# Patient Record
Sex: MALE | Race: WHITE | NOT HISPANIC OR LATINO | Employment: OTHER | URBAN - NONMETROPOLITAN AREA
[De-identification: names, ages, dates, MRNs, and addresses within clinical notes are randomized per-mention and may not be internally consistent; named-entity substitution may affect disease eponyms.]

---

## 2021-04-01 ENCOUNTER — HOSPITAL ENCOUNTER (OUTPATIENT)
Dept: GENERAL RADIOLOGY | Facility: HOSPITAL | Age: 56
Discharge: HOME | End: 2021-04-01
Admitting: NURSE PRACTITIONER

## 2021-04-01 ENCOUNTER — OFFICE VISIT (OUTPATIENT)
Dept: FAMILY MEDICINE CLINIC | Facility: CLINIC | Age: 56
End: 2021-04-01

## 2021-04-01 VITALS
TEMPERATURE: 97.9 F | HEIGHT: 74 IN | SYSTOLIC BLOOD PRESSURE: 118 MMHG | WEIGHT: 255 LBS | BODY MASS INDEX: 32.73 KG/M2 | HEART RATE: 64 BPM | OXYGEN SATURATION: 99 % | DIASTOLIC BLOOD PRESSURE: 78 MMHG | RESPIRATION RATE: 16 BRPM

## 2021-04-01 DIAGNOSIS — I10 ESSENTIAL HYPERTENSION: ICD-10-CM

## 2021-04-01 DIAGNOSIS — M79.602 PAIN IN BOTH UPPER EXTREMITIES: Primary | ICD-10-CM

## 2021-04-01 DIAGNOSIS — M79.602 PAIN IN BOTH UPPER EXTREMITIES: ICD-10-CM

## 2021-04-01 DIAGNOSIS — Z72.0 TOBACCO ABUSE: ICD-10-CM

## 2021-04-01 DIAGNOSIS — M54.2 CHRONIC NECK PAIN: ICD-10-CM

## 2021-04-01 DIAGNOSIS — D17.0 LIPOMA OF NECK: ICD-10-CM

## 2021-04-01 DIAGNOSIS — G89.29 CHRONIC NECK PAIN: ICD-10-CM

## 2021-04-01 DIAGNOSIS — E11.9 TYPE 2 DIABETES MELLITUS WITHOUT COMPLICATION, WITHOUT LONG-TERM CURRENT USE OF INSULIN (HCC): ICD-10-CM

## 2021-04-01 DIAGNOSIS — M79.601 PAIN IN BOTH UPPER EXTREMITIES: Primary | ICD-10-CM

## 2021-04-01 DIAGNOSIS — M79.601 PAIN IN BOTH UPPER EXTREMITIES: ICD-10-CM

## 2021-04-01 DIAGNOSIS — E78.5 HYPERLIPIDEMIA, UNSPECIFIED HYPERLIPIDEMIA TYPE: ICD-10-CM

## 2021-04-01 DIAGNOSIS — F43.10 PTSD (POST-TRAUMATIC STRESS DISORDER): ICD-10-CM

## 2021-04-01 PROCEDURE — 72050 X-RAY EXAM NECK SPINE 4/5VWS: CPT

## 2021-04-01 PROCEDURE — 99204 OFFICE O/P NEW MOD 45 MIN: CPT | Performed by: NURSE PRACTITIONER

## 2021-04-01 RX ORDER — GLIPIZIDE 5 MG/1
5 TABLET ORAL
COMMUNITY

## 2021-04-01 RX ORDER — LISINOPRIL 20 MG/1
20 TABLET ORAL DAILY
COMMUNITY

## 2021-04-01 RX ORDER — ASPIRIN 81 MG/1
81 TABLET ORAL DAILY
COMMUNITY

## 2021-04-01 RX ORDER — HYDROCODONE BITARTRATE AND ACETAMINOPHEN 5; 325 MG/1; MG/1
1 TABLET ORAL EVERY 6 HOURS PRN
Qty: 20 TABLET | Refills: 0 | Status: SHIPPED | OUTPATIENT
Start: 2021-04-01

## 2021-04-01 RX ORDER — BUSPIRONE HYDROCHLORIDE 15 MG/1
15 TABLET ORAL 2 TIMES DAILY
COMMUNITY

## 2021-04-01 RX ORDER — ATORVASTATIN CALCIUM 20 MG/1
20 TABLET, FILM COATED ORAL DAILY
COMMUNITY

## 2021-04-01 RX ORDER — FLUOXETINE HYDROCHLORIDE 20 MG/1
60 CAPSULE ORAL DAILY
COMMUNITY

## 2021-04-01 RX ORDER — AMLODIPINE BESYLATE 5 MG/1
5 TABLET ORAL DAILY
COMMUNITY

## 2021-04-01 RX ORDER — PRAZOSIN HYDROCHLORIDE 2 MG/1
4 CAPSULE ORAL NIGHTLY
COMMUNITY

## 2021-04-01 NOTE — PROGRESS NOTES
Please call the patient - Has deg disc disease.  [facility], he was interested in pain management.  If I could get a copy of his last [facility] labs, would like to see if maybe an anti-inflammatory would be appropriate.  Would like to do a trial of this before referral.  See if he can bring his [facility] labs in.  I gave him a short rx of norco till we figure things out.
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
## 2021-04-02 DIAGNOSIS — M54.2 CHRONIC NECK PAIN: Primary | ICD-10-CM

## 2021-04-02 DIAGNOSIS — G89.29 CHRONIC NECK PAIN: Primary | ICD-10-CM

## 2021-04-02 PROBLEM — Z72.0 TOBACCO ABUSE: Status: ACTIVE | Noted: 2021-04-02

## 2021-04-02 PROBLEM — D17.0 LIPOMA OF NECK: Status: ACTIVE | Noted: 2021-04-02

## 2021-04-02 PROBLEM — F43.10 PTSD (POST-TRAUMATIC STRESS DISORDER): Status: ACTIVE | Noted: 2021-04-02

## 2021-04-02 PROBLEM — E11.9 TYPE 2 DIABETES MELLITUS WITHOUT COMPLICATION, WITHOUT LONG-TERM CURRENT USE OF INSULIN (HCC): Status: ACTIVE | Noted: 2021-04-02

## 2021-04-02 PROBLEM — E78.5 HYPERLIPIDEMIA: Status: ACTIVE | Noted: 2021-04-02

## 2021-04-02 PROBLEM — I10 ESSENTIAL HYPERTENSION: Status: ACTIVE | Noted: 2021-04-02

## 2021-04-02 RX ORDER — MELOXICAM 15 MG/1
15 TABLET ORAL DAILY
Qty: 30 TABLET | Refills: 5 | Status: SHIPPED | OUTPATIENT
Start: 2021-04-02

## 2021-04-02 RX ORDER — MELOXICAM 15 MG/1
15 TABLET ORAL DAILY
Qty: 30 TABLET | Refills: 5 | Status: SHIPPED | OUTPATIENT
Start: 2021-04-02 | End: 2021-04-02 | Stop reason: SDUPTHER

## 2021-09-01 ENCOUNTER — OFFICE VISIT (OUTPATIENT)
Dept: FAMILY MEDICINE CLINIC | Facility: CLINIC | Age: 56
End: 2021-09-01

## 2021-09-01 VITALS
OXYGEN SATURATION: 98 % | RESPIRATION RATE: 16 BRPM | BODY MASS INDEX: 32.73 KG/M2 | HEART RATE: 64 BPM | HEIGHT: 74 IN | DIASTOLIC BLOOD PRESSURE: 82 MMHG | WEIGHT: 255 LBS | SYSTOLIC BLOOD PRESSURE: 140 MMHG

## 2021-09-01 DIAGNOSIS — Z20.822 SUSPECTED COVID-19 VIRUS INFECTION: ICD-10-CM

## 2021-09-01 DIAGNOSIS — J01.90 ACUTE NON-RECURRENT SINUSITIS, UNSPECIFIED LOCATION: Primary | ICD-10-CM

## 2021-09-01 PROCEDURE — 99213 OFFICE O/P EST LOW 20 MIN: CPT | Performed by: NURSE PRACTITIONER

## 2021-09-01 RX ORDER — AMOXICILLIN AND CLAVULANATE POTASSIUM 875; 125 MG/1; MG/1
1 TABLET, FILM COATED ORAL 2 TIMES DAILY
Qty: 20 TABLET | Refills: 0 | Status: SHIPPED | OUTPATIENT
Start: 2021-09-01 | End: 2021-09-11
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
## 2021-09-01 NOTE — PROGRESS NOTES
Covid 19 swab, done per oropharynx per MEET GERBER patient tolerated well, specimen secured and placed in freezer with order as directed.

## 2021-09-03 ENCOUNTER — TELEPHONE (OUTPATIENT)
Dept: INTERNAL MEDICINE | Facility: CLINIC | Age: 56
End: 2021-09-03

## 2021-09-03 DIAGNOSIS — U07.1 COVID-19: Primary | ICD-10-CM

## 2021-09-03 LAB — SARS-COV-2 RNA RESP QL NAA+PROBE: DETECTED

## 2021-09-03 RX ORDER — ATORVASTATIN CALCIUM 40 MG/1
40 TABLET, FILM COATED ORAL NIGHTLY
Qty: 14 TABLET | Refills: 0 | Status: SHIPPED | OUTPATIENT
Start: 2021-09-03

## 2021-09-03 RX ORDER — MELATONIN
1000 DAILY
Qty: 14 TABLET | Refills: 0 | Status: SHIPPED | OUTPATIENT
Start: 2021-09-03

## 2021-09-03 RX ORDER — FAMOTIDINE 20 MG/1
20 TABLET, FILM COATED ORAL 2 TIMES DAILY
Qty: 28 TABLET | Refills: 0 | Status: SHIPPED | OUTPATIENT
Start: 2021-09-03

## 2021-09-03 RX ORDER — ZINC SULFATE 50(220)MG
220 CAPSULE ORAL
Qty: 14 CAPSULE | Refills: 0 | Status: SHIPPED | OUTPATIENT
Start: 2021-09-03

## 2021-09-03 NOTE — TELEPHONE ENCOUNTER
Called patient with results and pt voiced understanding. Patient's wife was on the phone, too and states she has covid symptoms now with chills and body aches.

## 2021-09-03 NOTE — TELEPHONE ENCOUNTER
----- Message from ZABRINA Ley sent at 9/3/2021  7:45 AM CDT -----  Please call the patient - positive for covid.  Finish ABT for sinus issue as needed.  Advise ED if SOA, 10 day quarantine, covid cocktail sent.    Electronically signed by ZABRINA Ley, 09/03/21, 7:44 AM CDT.

## 2021-09-03 NOTE — PROGRESS NOTES
Please call the patient - positive for covid.  Finish ABT for sinus issue as needed.  Advise ED if SOA, 10 day quarantine, covid cocktail sent.    Electronically signed by ZABRINA Ley, 09/03/21, 7:44 AM CDT.

## 2024-10-14 ENCOUNTER — TELEPHONE (OUTPATIENT)
Dept: VASCULAR SURGERY | Facility: CLINIC | Age: 59
End: 2024-10-14
Payer: OTHER GOVERNMENT

## 2024-10-15 ENCOUNTER — PRE-ADMISSION TESTING (OUTPATIENT)
Dept: PREADMISSION TESTING | Facility: HOSPITAL | Age: 59
End: 2024-10-15
Payer: OTHER GOVERNMENT

## 2024-10-15 ENCOUNTER — HOSPITAL ENCOUNTER (OUTPATIENT)
Dept: GENERAL RADIOLOGY | Facility: HOSPITAL | Age: 59
Discharge: HOME | End: 2024-10-15
Payer: OTHER GOVERNMENT

## 2024-10-15 ENCOUNTER — OFFICE VISIT (OUTPATIENT)
Dept: VASCULAR SURGERY | Facility: CLINIC | Age: 59
End: 2024-10-15
Payer: OTHER GOVERNMENT

## 2024-10-15 VITALS
BODY MASS INDEX: 30.54 KG/M2 | HEART RATE: 64 BPM | HEIGHT: 74 IN | RESPIRATION RATE: 18 BRPM | DIASTOLIC BLOOD PRESSURE: 80 MMHG | WEIGHT: 238 LBS | SYSTOLIC BLOOD PRESSURE: 130 MMHG | OXYGEN SATURATION: 98 %

## 2024-10-15 VITALS
WEIGHT: 238.54 LBS | HEIGHT: 73 IN | DIASTOLIC BLOOD PRESSURE: 78 MMHG | BODY MASS INDEX: 31.61 KG/M2 | RESPIRATION RATE: 16 BRPM | OXYGEN SATURATION: 98 % | HEART RATE: 66 BPM | SYSTOLIC BLOOD PRESSURE: 126 MMHG

## 2024-10-15 DIAGNOSIS — I65.23 BILATERAL CAROTID ARTERY STENOSIS: ICD-10-CM

## 2024-10-15 DIAGNOSIS — I73.9 PAD (PERIPHERAL ARTERY DISEASE): ICD-10-CM

## 2024-10-15 DIAGNOSIS — E78.5 HYPERLIPIDEMIA, UNSPECIFIED HYPERLIPIDEMIA TYPE: ICD-10-CM

## 2024-10-15 DIAGNOSIS — M54.50 LUMBAR PAIN: ICD-10-CM

## 2024-10-15 DIAGNOSIS — I10 ESSENTIAL HYPERTENSION: ICD-10-CM

## 2024-10-15 DIAGNOSIS — M51.379 DEGENERATIVE DISC DISEASE AT L5-S1 LEVEL: Primary | ICD-10-CM

## 2024-10-15 LAB
ALBUMIN SERPL-MCNC: 4.7 G/DL (ref 3.5–5.2)
ALBUMIN/GLOB SERPL: 1.6 G/DL
ALP SERPL-CCNC: 65 U/L (ref 39–117)
ALT SERPL W P-5'-P-CCNC: 27 U/L (ref 1–41)
ANION GAP SERPL CALCULATED.3IONS-SCNC: 11 MMOL/L (ref 5–15)
APTT PPP: 28.8 SECONDS (ref 24.5–36)
AST SERPL-CCNC: 17 U/L (ref 1–40)
BILIRUB SERPL-MCNC: 0.9 MG/DL (ref 0–1.2)
BILIRUB UR QL STRIP: NEGATIVE
BUN SERPL-MCNC: 19 MG/DL (ref 8–23)
BUN/CREAT SERPL: 26 (ref 7–25)
CALCIUM SPEC-SCNC: 9.5 MG/DL (ref 8.6–10.5)
CHLORIDE SERPL-SCNC: 102 MMOL/L (ref 98–107)
CLARITY UR: CLEAR
CO2 SERPL-SCNC: 25 MMOL/L (ref 22–29)
COLOR UR: YELLOW
CREAT SERPL-MCNC: 0.73 MG/DL (ref 0.76–1.27)
DEPRECATED RDW RBC AUTO: 43.2 FL (ref 37–54)
EGFRCR SERPLBLD CKD-EPI 2021: 103.5 ML/MIN/1.73
ERYTHROCYTE [DISTWIDTH] IN BLOOD BY AUTOMATED COUNT: 13.2 % (ref 12.3–15.4)
GLOBULIN UR ELPH-MCNC: 2.9 GM/DL
GLUCOSE SERPL-MCNC: 125 MG/DL (ref 65–99)
GLUCOSE UR STRIP-MCNC: ABNORMAL MG/DL
HCT VFR BLD AUTO: 40.2 % (ref 37.5–51)
HGB BLD-MCNC: 13.2 G/DL (ref 13–17.7)
HGB UR QL STRIP.AUTO: NEGATIVE
INR PPP: 0.94 (ref 0.91–1.09)
KETONES UR QL STRIP: NEGATIVE
LEUKOCYTE ESTERASE UR QL STRIP.AUTO: NEGATIVE
MCH RBC QN AUTO: 29.3 PG (ref 26.6–33)
MCHC RBC AUTO-ENTMCNC: 32.8 G/DL (ref 31.5–35.7)
MCV RBC AUTO: 89.1 FL (ref 79–97)
NITRITE UR QL STRIP: NEGATIVE
PH UR STRIP.AUTO: 5.5 [PH] (ref 5–8)
PLATELET # BLD AUTO: 276 10*3/MM3 (ref 140–450)
PMV BLD AUTO: 9.7 FL (ref 6–12)
POTASSIUM SERPL-SCNC: 5.1 MMOL/L (ref 3.5–5.2)
PROT SERPL-MCNC: 7.6 G/DL (ref 6–8.5)
PROT UR QL STRIP: ABNORMAL
PROTHROMBIN TIME: 13 SECONDS (ref 11.8–14.8)
RBC # BLD AUTO: 4.51 10*6/MM3 (ref 4.14–5.8)
SODIUM SERPL-SCNC: 138 MMOL/L (ref 136–145)
SP GR UR STRIP: 1.03 (ref 1–1.03)
UROBILINOGEN UR QL STRIP: ABNORMAL
WBC NRBC COR # BLD AUTO: 8.42 10*3/MM3 (ref 3.4–10.8)

## 2024-10-15 PROCEDURE — 36415 COLL VENOUS BLD VENIPUNCTURE: CPT

## 2024-10-15 PROCEDURE — 93010 ELECTROCARDIOGRAM REPORT: CPT | Performed by: INTERNAL MEDICINE

## 2024-10-15 PROCEDURE — 71046 X-RAY EXAM CHEST 2 VIEWS: CPT

## 2024-10-15 PROCEDURE — 85730 THROMBOPLASTIN TIME PARTIAL: CPT

## 2024-10-15 PROCEDURE — 81003 URINALYSIS AUTO W/O SCOPE: CPT

## 2024-10-15 PROCEDURE — 93005 ELECTROCARDIOGRAM TRACING: CPT

## 2024-10-15 PROCEDURE — 85610 PROTHROMBIN TIME: CPT

## 2024-10-15 PROCEDURE — 99204 OFFICE O/P NEW MOD 45 MIN: CPT | Performed by: SURGERY

## 2024-10-15 PROCEDURE — 80053 COMPREHEN METABOLIC PANEL: CPT

## 2024-10-15 PROCEDURE — 85027 COMPLETE CBC AUTOMATED: CPT

## 2024-10-15 RX ORDER — SERTRALINE HYDROCHLORIDE 100 MG/1
100 TABLET, FILM COATED ORAL DAILY
COMMUNITY

## 2024-10-15 RX ORDER — GABAPENTIN 400 MG/1
400 CAPSULE ORAL DAILY
Status: ON HOLD | COMMUNITY
End: 2024-10-26
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
## 2024-10-15 NOTE — DISCHARGE INSTRUCTIONS
Preparing for Surgery  Follow these instructions before the procedure:  Several days or weeks before your procedure      Ask your health care provider about:  Changing or stopping your regular medicines. This is especially important if you are taking diabetes medicines or blood thinners.  Taking medicines such as aspirin and ibuprofen. These medicines can thin your blood. Do not take these medicines unless your health care provider tells you to take them.  Taking over-the-counter medicines, vitamins, herbs, and supplements.    Contact your surgeon if you:  Develop a fever of more than 100.4°F (38°C) or other feelings of illness during the 48 hours before your surgery.  Have symptoms that get worse.  Have questions or concerns about your surgery.  If you are going home the same day of your surgery you will need to arrange for a responsible adult, age 18 years old or older, to drive you home from the hospital and stay with you for 24 hours. Verification of the  will be made prior to any procedure requiring sedation. You may not go home in a taxi or any form of public transportation by yourself.     Day before your procedure    24 hours before your procedure DO NOT drink alcoholic beverages or smoke.  24 hours before your procedure STOP taking Erectile Dysfunction medication (i.e.,Cialis, Viagra)   You may be asked to shower with a germ-killing soap.  Day of your procedure   You may take the following medication(s) the morning of surgery with a sip of water:  NORCO, BUSPAR, GABAPENTIN       8 hours before your scheduled arrival time, STOP all food, any dairy products, and full liquids. This includes hard candy, chewing gum or mints. This is extremely important to prevent serious complications.     Up to 2 hours before your scheduled arrival time, you may have clear liquids no cream, powder, or pulp of any kind. Safe options are water, black coffee, plain tea, soda, Gatorade/Powerade, clear broth, apple  juice.    2 hours before your scheduled arrival time, STOP drinking clear liquids.    You may need to take another shower with a germ-killing soap before you leave home in the morning. Do not use perfumes, colognes, or body lotions.  Wear comfortable loose-fitting clothing.  Remove all jewelry including body piercing and rings, dark colored nail polish, and make up prior to arrival at the hospital. Leave all valuables at home.   Bring your hearing aids if you rely on them.  Do not wear contact lenses. If you wear eyeglasses remember to bring a case to store them in while you are in surgery.  Do not use denture adhesives since you will be asked to remove them during your surgery.    You do not need to bring your home medications into the hospital.   Bring your sleep apnea device with you on the day of your surgery (if this applies to you).  If you wear portable oxygen, bring it with you.   If you are staying overnight, you may bring a bag of items you may need such as slippers, robe and a change of clothes for your discharge. You may want to leave these items in the car until you are ready for them since your family will take your belongings when you leave the pre-operative area.  Arrive at the hospital as scheduled by the office. You will be asked to arrive 2 hours prior to your surgery time in order to prepare for your procedure.  When you arrive at the hospital  Go to the registration desk located at the main entrance of the hospital.  After registration is completed, you will be given a beeper and a sticker sheet. Take the stickers to Outpatient Surgery and place in the tray at the end of the desk to notify the staff that you have arrived and registered.   Return to the lobby to wait. You are not always called back according to the time of arrival but rather the time your doctor will be ready.  When your beeper lights up and vibrates proceed through the double doors, under the stairs, and a member of the  Outpatient Surgery staff will escort you to your preoperative room.                   How to Use Chlorhexidine Before Surgery  Chlorhexidine gluconate (CHG) is a germ-killing (antiseptic) solution that is used to clean the skin. It can get rid of the bacteria that normally live on the skin and can keep them away for about 24 hours. To clean your skin with CHG, you may be given:  A CHG solution to use in the shower or as part of a sponge bath.  A prepackaged cloth that contains CHG.  Cleaning your skin with CHG may help lower the risk for infection:  While you are staying in the intensive care unit of the hospital.  If you have a vascular access, such as a central line, to provide short-term or long-term access to your veins.  If you have a catheter to drain urine from your bladder.  If you are on a ventilator. A ventilator is a machine that helps you breathe by moving air in and out of your lungs.  After surgery.  What are the risks?  Risks of using CHG include:  A skin reaction.  Hearing loss, if CHG gets in your ears and you have a perforated eardrum.  Eye injury, if CHG gets in your eyes and is not rinsed out.  The CHG product catching fire.  Make sure that you avoid smoking and flames after applying CHG to your skin.  Do not use CHG:  If you have a chlorhexidine allergy or have previously reacted to chlorhexidine.  On babies younger than 2 months of age.  How to use CHG solution  Use CHG only as told by your health care provider, and follow the instructions on the label.  Use the full amount of CHG as directed. Usually, this is one bottle.  During a shower    Follow these steps when using CHG solution during a shower (unless your health care provider gives you different instructions):  Start the shower.  Use your normal soap and shampoo to wash your face and hair.  Turn off the shower or move out of the shower stream.  Pour the CHG onto a clean washcloth. Do not use any type of brush or rough-edged  sponge.  Starting at your neck, lather your body down to your toes. Make sure you follow these instructions:  If you will be having surgery, pay special attention to the part of your body where you will be having surgery. Scrub this area for at least 1 minute.  Do not use CHG on your head or face. If the solution gets into your ears or eyes, rinse them well with water.  Avoid your genital area.  Avoid any areas of skin that have broken skin, cuts, or scrapes.  Scrub your back and under your arms. Make sure to wash skin folds.  Let the lather sit on your skin for 1-2 minutes or as long as told by your health care provider.  Thoroughly rinse your entire body in the shower. Make sure that all body creases and crevices are rinsed well.  Dry off with a clean towel. Do not put any substances on your body afterward--such as powder, lotion, or perfume--unless you are told to do so by your health care provider. Only use lotions that are recommended by the .  Put on clean clothes or pajamas.  If it is the night before your surgery, sleep in clean sheets.     During a sponge bath  Follow these steps when using CHG solution during a sponge bath (unless your health care provider gives you different instructions):  Use your normal soap and shampoo to wash your face and hair.  Pour the CHG onto a clean washcloth.  Starting at your neck, lather your body down to your toes. Make sure you follow these instructions:  If you will be having surgery, pay special attention to the part of your body where you will be having surgery. Scrub this area for at least 1 minute.  Do not use CHG on your head or face. If the solution gets into your ears or eyes, rinse them well with water.  Avoid your genital area.  Avoid any areas of skin that have broken skin, cuts, or scrapes.  Scrub your back and under your arms. Make sure to wash skin folds.  Let the lather sit on your skin for 1-2 minutes or as long as told by your health care  provider.  Using a different clean, wet washcloth, thoroughly rinse your entire body. Make sure that all body creases and crevices are rinsed well.  Dry off with a clean towel. Do not put any substances on your body afterward--such as powder, lotion, or perfume--unless you are told to do so by your health care provider. Only use lotions that are recommended by the .  Put on clean clothes or pajamas.  If it is the night before your surgery, sleep in clean sheets.  How to use CHG prepackaged cloths  Only use CHG cloths as told by your health care provider, and follow the instructions on the label.  Use the CHG cloth on clean, dry skin.  Do not use the CHG cloth on your head or face unless your health care provider tells you to.  When washing with the CHG cloth:  Avoid your genital area.  Avoid any areas of skin that have broken skin, cuts, or scrapes.  Before surgery    Follow these steps when using a CHG cloth to clean before surgery (unless your health care provider gives you different instructions):  Using the CHG cloth, vigorously scrub the part of your body where you will be having surgery. Scrub using a back-and-forth motion for 3 minutes. The area on your body should be completely wet with CHG when you are done scrubbing.  Do not rinse. Discard the cloth and let the area air-dry. Do not put any substances on the area afterward, such as powder, lotion, or perfume.  Put on clean clothes or pajamas.  If it is the night before your surgery, sleep in clean sheets.     For general bathing  Follow these steps when using CHG cloths for general bathing (unless your health care provider gives you different instructions).  Use a separate CHG cloth for each area of your body. Make sure you wash between any folds of skin and between your fingers and toes. Wash your body in the following order, switching to a new cloth after each step:  The front of your neck, shoulders, and chest.  Both of your arms, under your  arms, and your hands.  Your stomach and groin area, avoiding the genitals.  Your right leg and foot.  Your left leg and foot.  The back of your neck, your back, and your buttocks.  Do not rinse. Discard the cloth and let the area air-dry. Do not put any substances on your body afterward--such as powder, lotion, or perfume--unless you are told to do so by your health care provider. Only use lotions that are recommended by the .  Put on clean clothes or pajamas.  Contact a health care provider if:  Your skin gets irritated after scrubbing.  You have questions about using your solution or cloth.  You swallow any chlorhexidine. Call your local poison control center (1-153.807.6395 in the [place]  Get help right away if:  Your eyes itch badly, or they become very red or swollen.  Your skin itches badly and is red or swollen.  Your hearing changes.  You have trouble seeing.  You have swelling or tingling in your mouth or throat.  You have trouble breathing.  These symptoms may represent a serious problem that is an emergency. Do not wait to see if the symptoms will go away. Get medical help right away. Call your local emergency services (986 in the [place] Do not drive yourself to the hospital.  Summary  Chlorhexidine gluconate (CHG) is a germ-killing (antiseptic) solution that is used to clean the skin. Cleaning your skin with CHG may help to lower your risk for infection.  You may be given CHG to use for bathing. It may be in a bottle or in a prepackaged cloth to use on your skin. Carefully follow your health care provider's instructions and the instructions on the product label.  Do not use CHG if you have a chlorhexidine allergy.  Contact your health care provider if your skin gets irritated after scrubbing.  This information is not intended to replace advice given to you by your health care provider. Make sure you discuss any questions you have with your health care provider.  Document Revised: 04/17/2023  Document Reviewed: 02/28/2022  Elsevier Patient Education © 2023 [employer]

## 2024-10-16 ENCOUNTER — PATIENT ROUNDING (BHMG ONLY) (OUTPATIENT)
Dept: VASCULAR SURGERY | Facility: CLINIC | Age: 59
End: 2024-10-16
Payer: OTHER GOVERNMENT

## 2024-10-16 NOTE — PROGRESS NOTES
October 16, 2024    Hello, may I speak with Elia Ryan?    My name is ATUL      I am  with [facility]  [address], [address]  [place] [address] [phone].    Before we get started may I verify your date of birth? 1965    I am calling to officially welcome you to our practice and ask about your recent visit. Is this a good time to talk? yes    Tell me about your visit with us. What things went well?  EVERYTHING WENT WELL EVERYONE IS VERY NICE       We're always looking for ways to make our patients' experiences even better. Do you have recommendations on ways we may improve?  no    Overall were you satisfied with your first visit to our practice? yes       I appreciate you taking the time to speak with me today. Is there anything else I can do for you? no      Thank you, and have a great day.

## 2024-10-17 LAB
QT INTERVAL: 426 MS
QTC INTERVAL: 435 MS

## 2024-10-24 ENCOUNTER — ANESTHESIA EVENT (OUTPATIENT)
Dept: PERIOP | Facility: HOSPITAL | Age: 59
End: 2024-10-24
Payer: OTHER GOVERNMENT

## 2024-10-25 ENCOUNTER — APPOINTMENT (OUTPATIENT)
Dept: GENERAL RADIOLOGY | Facility: HOSPITAL | Age: 59
DRG: 428 | End: 2024-10-25
Payer: OTHER GOVERNMENT

## 2024-10-25 ENCOUNTER — HOSPITAL ENCOUNTER (INPATIENT)
Facility: HOSPITAL | Age: 59
LOS: 6 days | Discharge: HOME | DRG: 428 | End: 2024-10-31
Attending: ORTHOPAEDIC SURGERY | Admitting: ORTHOPAEDIC SURGERY
Payer: OTHER GOVERNMENT

## 2024-10-25 ENCOUNTER — ANESTHESIA (OUTPATIENT)
Dept: PERIOP | Facility: HOSPITAL | Age: 59
End: 2024-10-25
Payer: OTHER GOVERNMENT

## 2024-10-25 DIAGNOSIS — R26.9 GAIT ABNORMALITY: ICD-10-CM

## 2024-10-25 DIAGNOSIS — Z74.09 IMPAIRED MOBILITY: ICD-10-CM

## 2024-10-25 DIAGNOSIS — M54.16 LUMBAR RADICULOPATHY: Primary | ICD-10-CM

## 2024-10-25 LAB
ABO GROUP BLD: NORMAL
BLD GP AB SCN SERPL QL: NEGATIVE
GLUCOSE BLDC GLUCOMTR-MCNC: 148 MG/DL (ref 70–130)
GLUCOSE BLDC GLUCOMTR-MCNC: 191 MG/DL (ref 70–130)
RH BLD: POSITIVE
T&S EXPIRATION DATE: NORMAL

## 2024-10-25 PROCEDURE — 86901 BLOOD TYPING SEROLOGIC RH(D): CPT | Performed by: ORTHOPAEDIC SURGERY

## 2024-10-25 PROCEDURE — 25010000002 DEXAMETHASONE PER 1 MG

## 2024-10-25 PROCEDURE — 76000 FLUOROSCOPY <1 HR PHYS/QHP: CPT

## 2024-10-25 PROCEDURE — 0SG30A0 FUSION OF LUMBOSACRAL JOINT WITH INTERBODY FUSION DEVICE, ANTERIOR APPROACH, ANTERIOR COLUMN, OPEN APPROACH: ICD-10-PCS | Performed by: ORTHOPAEDIC SURGERY

## 2024-10-25 PROCEDURE — 25810000003 SODIUM CHLORIDE PER 500 ML: Performed by: ORTHOPAEDIC SURGERY

## 2024-10-25 PROCEDURE — C1765 ADHESION BARRIER: HCPCS | Performed by: ORTHOPAEDIC SURGERY

## 2024-10-25 PROCEDURE — 86850 RBC ANTIBODY SCREEN: CPT | Performed by: ORTHOPAEDIC SURGERY

## 2024-10-25 PROCEDURE — C1713 ANCHOR/SCREW BN/BN,TIS/BN: HCPCS | Performed by: ORTHOPAEDIC SURGERY

## 2024-10-25 PROCEDURE — 25010000002 ONDANSETRON PER 1 MG

## 2024-10-25 PROCEDURE — 74018 RADEX ABDOMEN 1 VIEW: CPT

## 2024-10-25 PROCEDURE — 22558 ARTHRD ANT NTRBD MIN DSC LUM: CPT | Performed by: SURGERY

## 2024-10-25 PROCEDURE — 25010000002 SUGAMMADEX 200 MG/2ML SOLUTION

## 2024-10-25 PROCEDURE — 86900 BLOOD TYPING SEROLOGIC ABO: CPT | Performed by: ORTHOPAEDIC SURGERY

## 2024-10-25 PROCEDURE — 25810000003 SODIUM CHLORIDE 0.9 % SOLUTION: Performed by: PHYSICIAN ASSISTANT

## 2024-10-25 PROCEDURE — 25010000002 FENTANYL CITRATE (PF) 50 MCG/ML SOLUTION

## 2024-10-25 PROCEDURE — 82948 REAGENT STRIP/BLOOD GLUCOSE: CPT

## 2024-10-25 PROCEDURE — 25010000002 PROPOFOL 10 MG/ML EMULSION

## 2024-10-25 PROCEDURE — 72100 X-RAY EXAM L-S SPINE 2/3 VWS: CPT

## 2024-10-25 PROCEDURE — 25810000003 LACTATED RINGERS PER 1000 ML: Performed by: ORTHOPAEDIC SURGERY

## 2024-10-25 PROCEDURE — 25010000002 DROPERIDOL PER 5 MG: Performed by: ANESTHESIOLOGY

## 2024-10-25 PROCEDURE — 97165 OT EVAL LOW COMPLEX 30 MIN: CPT | Performed by: OCCUPATIONAL THERAPIST

## 2024-10-25 PROCEDURE — 25010000002 HYDROMORPHONE PER 4 MG: Performed by: ANESTHESIOLOGY

## 2024-10-25 PROCEDURE — 25010000002 LIDOCAINE PF 2% 2 % SOLUTION

## 2024-10-25 PROCEDURE — 0SB40ZZ EXCISION OF LUMBOSACRAL DISC, OPEN APPROACH: ICD-10-PCS | Performed by: ORTHOPAEDIC SURGERY

## 2024-10-25 PROCEDURE — 97161 PT EVAL LOW COMPLEX 20 MIN: CPT

## 2024-10-25 PROCEDURE — 25010000002 CEFAZOLIN PER 500 MG: Performed by: ORTHOPAEDIC SURGERY

## 2024-10-25 DEVICE — ALLOGRFT CORT NMP FIBR FZD 11.1CC LG LNG STRL: Type: IMPLANTABLE DEVICE | Site: ABDOMEN | Status: FUNCTIONAL

## 2024-10-25 DEVICE — IDENTITI ALIF SA GRAFT BOLT, Ø8.5 X 30 MM
Type: IMPLANTABLE DEVICE | Site: SPINE LUMBAR | Status: FUNCTIONAL
Brand: IDENTITI

## 2024-10-25 DEVICE — KT HEMOST ABS SURGIFOAM PORCN 1GRAM: Type: IMPLANTABLE DEVICE | Site: SPINE LUMBAR | Status: FUNCTIONAL

## 2024-10-25 DEVICE — IDENTITI ALIF SA SPACER, 7 X 42 X 30 MM, 20°
Type: IMPLANTABLE DEVICE | Site: SPINE LUMBAR | Status: FUNCTIONAL
Brand: IDENTITI

## 2024-10-25 DEVICE — HEMOST ABS SURGIFOAM SZ100 8X12 10MM: Type: IMPLANTABLE DEVICE | Site: ABDOMEN | Status: FUNCTIONAL

## 2024-10-25 DEVICE — VERSAWRAP IS AN ABSORBABLE IMPLANT (DEVICE), DESIGNED TO SERVE AS AN INTERFACE BETWEEN TARGET TISSUES AND SURROUNDING TISSUES TO PROVIDE A NON-CONSTRICTING, PROTECTIVE ENCASEMENT. VERSAWRAP CONSISTS OF A CLEAR SHEET AND A WETTING SOLUTION. THE CLEAR SHEET IS A THIN MEMBRANE OF CROSSLINKED CALCIUM ALGINATE AND GLYCOSAMINOGLYCAN. VERSAWRAP SHEET IS EASY TO HANDLE, CONFORMABLE, AND IS DESIGNED FOR PLACEMENT UNDER, AROUND, OR OVER INJURED TISSUES AND/OR SURROUNDING TISSUES. VERSAWRAP SHEET IS SUPPLIED STERILE, NON-PYROGENIC, FOR SINGLE USE, IN DOUBLE PEEL POUCHES. THE VERSAWRAP SOLUTION IS APPLIED TO THE SHEET TO RENDER THE SHEET A GELATINOUS, TISSUE ADHERENT LAYER (GEL IN SITU). THE AQUEOUS CITRATE SOLUTION IS PROVIDED STERILE, NON-PYROGENIC, FOR SINGLE USE, IN A DROPPER, PACKAGED IN A DOUBLE PEEL POUCH.
Type: IMPLANTABLE DEVICE | Site: SPINE LUMBAR | Status: FUNCTIONAL
Brand: VERSAWRAP

## 2024-10-25 DEVICE — ALIF SCREW, 6.0MM X 30MM
Type: IMPLANTABLE DEVICE | Site: SPINE LUMBAR | Status: FUNCTIONAL
Brand: ASPIDA

## 2024-10-25 DEVICE — 18MM SACRAL PLATE
Type: IMPLANTABLE DEVICE | Site: SPINE LUMBAR | Status: FUNCTIONAL
Brand: ASPIDA

## 2024-10-25 DEVICE — IDENTITI ALIF SA LATERAL SCREW, Ø5 X 30 MM
Type: IMPLANTABLE DEVICE | Site: SPINE LUMBAR | Status: FUNCTIONAL
Brand: IDENTITI

## 2024-10-25 RX ORDER — CYCLOBENZAPRINE HCL 10 MG
10 TABLET ORAL 3 TIMES DAILY PRN
Status: DISCONTINUED | OUTPATIENT
Start: 2024-10-25 | End: 2024-10-31 | Stop reason: HOSPADM

## 2024-10-25 RX ORDER — SODIUM CHLORIDE 0.9 % (FLUSH) 0.9 %
3-10 SYRINGE (ML) INJECTION AS NEEDED
Status: DISCONTINUED | OUTPATIENT
Start: 2024-10-25 | End: 2024-10-25 | Stop reason: HOSPADM

## 2024-10-25 RX ORDER — POLYETHYLENE GLYCOL 3350 17 G/17G
17 POWDER, FOR SOLUTION ORAL DAILY PRN
Status: DISCONTINUED | OUTPATIENT
Start: 2024-10-25 | End: 2024-10-31 | Stop reason: HOSPADM

## 2024-10-25 RX ORDER — TERAZOSIN 2 MG/1
2 CAPSULE ORAL NIGHTLY
Status: DISCONTINUED | OUTPATIENT
Start: 2024-10-25 | End: 2024-10-31 | Stop reason: HOSPADM

## 2024-10-25 RX ORDER — FENTANYL CITRATE 50 UG/ML
INJECTION, SOLUTION INTRAMUSCULAR; INTRAVENOUS AS NEEDED
Status: DISCONTINUED | OUTPATIENT
Start: 2024-10-25 | End: 2024-10-25 | Stop reason: SURG

## 2024-10-25 RX ORDER — HYDROMORPHONE HYDROCHLORIDE 1 MG/ML
0.5 INJECTION, SOLUTION INTRAMUSCULAR; INTRAVENOUS; SUBCUTANEOUS
Status: DISCONTINUED | OUTPATIENT
Start: 2024-10-25 | End: 2024-10-25 | Stop reason: HOSPADM

## 2024-10-25 RX ORDER — SODIUM CHLORIDE 9 MG/ML
40 INJECTION, SOLUTION INTRAVENOUS AS NEEDED
Status: DISPENSED | OUTPATIENT
Start: 2024-10-25 | End: 2024-10-30

## 2024-10-25 RX ORDER — SODIUM CHLORIDE 9 MG/ML
40 INJECTION, SOLUTION INTRAVENOUS AS NEEDED
Status: DISCONTINUED | OUTPATIENT
Start: 2024-10-25 | End: 2024-10-25 | Stop reason: HOSPADM

## 2024-10-25 RX ORDER — SODIUM CHLORIDE 0.9 % (FLUSH) 0.9 %
3 SYRINGE (ML) INJECTION AS NEEDED
Status: DISCONTINUED | OUTPATIENT
Start: 2024-10-25 | End: 2024-10-25 | Stop reason: HOSPADM

## 2024-10-25 RX ORDER — AMLODIPINE BESYLATE 5 MG/1
5 TABLET ORAL DAILY
Status: DISCONTINUED | OUTPATIENT
Start: 2024-10-25 | End: 2024-10-31 | Stop reason: HOSPADM

## 2024-10-25 RX ORDER — DIPHENHYDRAMINE HCL 25 MG
25 CAPSULE ORAL NIGHTLY PRN
Status: DISCONTINUED | OUTPATIENT
Start: 2024-10-25 | End: 2024-10-31 | Stop reason: HOSPADM

## 2024-10-25 RX ORDER — ONDANSETRON 4 MG/1
4 TABLET, ORALLY DISINTEGRATING ORAL EVERY 6 HOURS PRN
Status: DISCONTINUED | OUTPATIENT
Start: 2024-10-25 | End: 2024-10-31 | Stop reason: HOSPADM

## 2024-10-25 RX ORDER — ONDANSETRON 2 MG/ML
4 INJECTION INTRAMUSCULAR; INTRAVENOUS
Status: DISCONTINUED | OUTPATIENT
Start: 2024-10-25 | End: 2024-10-25 | Stop reason: HOSPADM

## 2024-10-25 RX ORDER — GLIPIZIDE 5 MG/1
5 TABLET ORAL
Status: DISCONTINUED | OUTPATIENT
Start: 2024-10-25 | End: 2024-10-31 | Stop reason: HOSPADM

## 2024-10-25 RX ORDER — OXYCODONE HCL 20 MG/1
20 TABLET, FILM COATED, EXTENDED RELEASE ORAL ONCE
Status: COMPLETED | OUTPATIENT
Start: 2024-10-25 | End: 2024-10-25

## 2024-10-25 RX ORDER — SODIUM CHLORIDE 0.9 % (FLUSH) 0.9 %
10 SYRINGE (ML) INJECTION AS NEEDED
Status: DISCONTINUED | OUTPATIENT
Start: 2024-10-25 | End: 2024-10-25 | Stop reason: HOSPADM

## 2024-10-25 RX ORDER — ACETAMINOPHEN 500 MG
1000 TABLET ORAL ONCE
Status: COMPLETED | OUTPATIENT
Start: 2024-10-25 | End: 2024-10-25

## 2024-10-25 RX ORDER — NALOXONE HCL 0.4 MG/ML
0.04 VIAL (ML) INJECTION AS NEEDED
Status: DISCONTINUED | OUTPATIENT
Start: 2024-10-25 | End: 2024-10-25 | Stop reason: HOSPADM

## 2024-10-25 RX ORDER — MIDAZOLAM HYDROCHLORIDE 2 MG/2ML
1 INJECTION, SOLUTION INTRAMUSCULAR; INTRAVENOUS
Status: DISCONTINUED | OUTPATIENT
Start: 2024-10-25 | End: 2024-10-25 | Stop reason: HOSPADM

## 2024-10-25 RX ORDER — DROPERIDOL 2.5 MG/ML
0.62 INJECTION, SOLUTION INTRAMUSCULAR; INTRAVENOUS ONCE AS NEEDED
Status: COMPLETED | OUTPATIENT
Start: 2024-10-25 | End: 2024-10-25

## 2024-10-25 RX ORDER — FAMOTIDINE 20 MG/1
20 TABLET, FILM COATED ORAL EVERY 12 HOURS SCHEDULED
Status: DISCONTINUED | OUTPATIENT
Start: 2024-10-25 | End: 2024-10-31 | Stop reason: HOSPADM

## 2024-10-25 RX ORDER — LIDOCAINE HYDROCHLORIDE 20 MG/ML
INJECTION, SOLUTION EPIDURAL; INFILTRATION; INTRACAUDAL; PERINEURAL AS NEEDED
Status: DISCONTINUED | OUTPATIENT
Start: 2024-10-25 | End: 2024-10-25 | Stop reason: SURG

## 2024-10-25 RX ORDER — ONDANSETRON 2 MG/ML
4 INJECTION INTRAMUSCULAR; INTRAVENOUS EVERY 6 HOURS PRN
Status: DISCONTINUED | OUTPATIENT
Start: 2024-10-25 | End: 2024-10-26 | Stop reason: SDUPTHER

## 2024-10-25 RX ORDER — LABETALOL HYDROCHLORIDE 5 MG/ML
5 INJECTION, SOLUTION INTRAVENOUS
Status: DISCONTINUED | OUTPATIENT
Start: 2024-10-25 | End: 2024-10-25 | Stop reason: HOSPADM

## 2024-10-25 RX ORDER — ONDANSETRON 2 MG/ML
INJECTION INTRAMUSCULAR; INTRAVENOUS AS NEEDED
Status: DISCONTINUED | OUTPATIENT
Start: 2024-10-25 | End: 2024-10-25 | Stop reason: SURG

## 2024-10-25 RX ORDER — DEXAMETHASONE SODIUM PHOSPHATE 4 MG/ML
INJECTION, SOLUTION INTRA-ARTICULAR; INTRALESIONAL; INTRAMUSCULAR; INTRAVENOUS; SOFT TISSUE AS NEEDED
Status: DISCONTINUED | OUTPATIENT
Start: 2024-10-25 | End: 2024-10-25 | Stop reason: SURG

## 2024-10-25 RX ORDER — SODIUM CHLORIDE 0.9 % (FLUSH) 0.9 %
10 SYRINGE (ML) INJECTION EVERY 12 HOURS SCHEDULED
Status: DISCONTINUED | OUTPATIENT
Start: 2024-10-25 | End: 2024-10-25 | Stop reason: HOSPADM

## 2024-10-25 RX ORDER — CYCLOBENZAPRINE HCL 10 MG
10 TABLET ORAL 3 TIMES DAILY PRN
COMMUNITY

## 2024-10-25 RX ORDER — SODIUM CHLORIDE 0.9 % (FLUSH) 0.9 %
10 SYRINGE (ML) INJECTION AS NEEDED
Status: DISCONTINUED | OUTPATIENT
Start: 2024-10-25 | End: 2024-10-31 | Stop reason: HOSPADM

## 2024-10-25 RX ORDER — ATORVASTATIN CALCIUM 40 MG/1
40 TABLET, FILM COATED ORAL NIGHTLY
Status: DISCONTINUED | OUTPATIENT
Start: 2024-10-25 | End: 2024-10-31 | Stop reason: HOSPADM

## 2024-10-25 RX ORDER — MAGNESIUM HYDROXIDE 1200 MG/15ML
LIQUID ORAL AS NEEDED
Status: DISCONTINUED | OUTPATIENT
Start: 2024-10-25 | End: 2024-10-25 | Stop reason: HOSPADM

## 2024-10-25 RX ORDER — BISACODYL 5 MG/1
5 TABLET, DELAYED RELEASE ORAL DAILY PRN
Status: DISCONTINUED | OUTPATIENT
Start: 2024-10-25 | End: 2024-10-31 | Stop reason: HOSPADM

## 2024-10-25 RX ORDER — SODIUM CHLORIDE, SODIUM LACTATE, POTASSIUM CHLORIDE, CALCIUM CHLORIDE 600; 310; 30; 20 MG/100ML; MG/100ML; MG/100ML; MG/100ML
100 INJECTION, SOLUTION INTRAVENOUS CONTINUOUS
Status: DISCONTINUED | OUTPATIENT
Start: 2024-10-25 | End: 2024-10-25 | Stop reason: HOSPADM

## 2024-10-25 RX ORDER — BISACODYL 10 MG
10 SUPPOSITORY, RECTAL RECTAL DAILY PRN
Status: DISCONTINUED | OUTPATIENT
Start: 2024-10-25 | End: 2024-10-31 | Stop reason: HOSPADM

## 2024-10-25 RX ORDER — SODIUM CHLORIDE 0.9 % (FLUSH) 0.9 %
3 SYRINGE (ML) INJECTION EVERY 12 HOURS SCHEDULED
Status: DISCONTINUED | OUTPATIENT
Start: 2024-10-25 | End: 2024-10-31 | Stop reason: HOSPADM

## 2024-10-25 RX ORDER — SODIUM CHLORIDE 0.9 % (FLUSH) 0.9 %
3 SYRINGE (ML) INJECTION EVERY 12 HOURS SCHEDULED
Status: DISCONTINUED | OUTPATIENT
Start: 2024-10-25 | End: 2024-10-25 | Stop reason: HOSPADM

## 2024-10-25 RX ORDER — ROCURONIUM BROMIDE 10 MG/ML
INJECTION, SOLUTION INTRAVENOUS AS NEEDED
Status: DISCONTINUED | OUTPATIENT
Start: 2024-10-25 | End: 2024-10-25 | Stop reason: SURG

## 2024-10-25 RX ORDER — FLUMAZENIL 0.1 MG/ML
0.2 INJECTION INTRAVENOUS AS NEEDED
Status: DISCONTINUED | OUTPATIENT
Start: 2024-10-25 | End: 2024-10-25 | Stop reason: HOSPADM

## 2024-10-25 RX ORDER — SERTRALINE HYDROCHLORIDE 100 MG/1
100 TABLET, FILM COATED ORAL DAILY
Status: DISCONTINUED | OUTPATIENT
Start: 2024-10-25 | End: 2024-10-31 | Stop reason: HOSPADM

## 2024-10-25 RX ORDER — SODIUM CHLORIDE, SODIUM LACTATE, POTASSIUM CHLORIDE, CALCIUM CHLORIDE 600; 310; 30; 20 MG/100ML; MG/100ML; MG/100ML; MG/100ML
1000 INJECTION, SOLUTION INTRAVENOUS CONTINUOUS
Status: DISCONTINUED | OUTPATIENT
Start: 2024-10-25 | End: 2024-10-25 | Stop reason: HOSPADM

## 2024-10-25 RX ORDER — SODIUM CHLORIDE 9 MG/ML
INJECTION, SOLUTION INTRAVENOUS AS NEEDED
Status: DISCONTINUED | OUTPATIENT
Start: 2024-10-25 | End: 2024-10-25 | Stop reason: HOSPADM

## 2024-10-25 RX ORDER — ONDANSETRON 2 MG/ML
4 INJECTION INTRAMUSCULAR; INTRAVENOUS EVERY 6 HOURS PRN
Status: DISCONTINUED | OUTPATIENT
Start: 2024-10-25 | End: 2024-10-31 | Stop reason: HOSPADM

## 2024-10-25 RX ORDER — PROPOFOL 10 MG/ML
VIAL (ML) INTRAVENOUS AS NEEDED
Status: DISCONTINUED | OUTPATIENT
Start: 2024-10-25 | End: 2024-10-25 | Stop reason: SURG

## 2024-10-25 RX ORDER — AMOXICILLIN 250 MG
2 CAPSULE ORAL 2 TIMES DAILY PRN
Status: DISCONTINUED | OUTPATIENT
Start: 2024-10-25 | End: 2024-10-31 | Stop reason: HOSPADM

## 2024-10-25 RX ORDER — OXYCODONE AND ACETAMINOPHEN 10; 325 MG/1; MG/1
1 TABLET ORAL EVERY 4 HOURS PRN
Status: DISCONTINUED | OUTPATIENT
Start: 2024-10-25 | End: 2024-10-25 | Stop reason: HOSPADM

## 2024-10-25 RX ORDER — CHOLECALCIFEROL (VITAMIN D3) 25 MCG
1000 TABLET ORAL DAILY
Status: DISCONTINUED | OUTPATIENT
Start: 2024-10-25 | End: 2024-10-31 | Stop reason: HOSPADM

## 2024-10-25 RX ORDER — IBUPROFEN 600 MG/1
600 TABLET, FILM COATED ORAL EVERY 6 HOURS PRN
Status: DISCONTINUED | OUTPATIENT
Start: 2024-10-25 | End: 2024-10-25 | Stop reason: HOSPADM

## 2024-10-25 RX ORDER — FENTANYL CITRATE 50 UG/ML
50 INJECTION, SOLUTION INTRAMUSCULAR; INTRAVENOUS
Status: DISCONTINUED | OUTPATIENT
Start: 2024-10-25 | End: 2024-10-25 | Stop reason: HOSPADM

## 2024-10-25 RX ORDER — GABAPENTIN 400 MG/1
400 CAPSULE ORAL DAILY
Status: DISCONTINUED | OUTPATIENT
Start: 2024-10-26 | End: 2024-10-31 | Stop reason: HOSPADM

## 2024-10-25 RX ORDER — SODIUM CHLORIDE 9 MG/ML
75 INJECTION, SOLUTION INTRAVENOUS CONTINUOUS
Status: DISPENSED | OUTPATIENT
Start: 2024-10-25 | End: 2024-10-27

## 2024-10-25 RX ORDER — FAMOTIDINE 10 MG/ML
20 INJECTION, SOLUTION INTRAVENOUS EVERY 12 HOURS SCHEDULED
Status: DISCONTINUED | OUTPATIENT
Start: 2024-10-25 | End: 2024-10-31 | Stop reason: HOSPADM

## 2024-10-25 RX ORDER — HYDROCODONE BITARTRATE AND ACETAMINOPHEN 10; 325 MG/1; MG/1
1 TABLET ORAL EVERY 8 HOURS PRN
Status: DISCONTINUED | OUTPATIENT
Start: 2024-10-25 | End: 2024-10-31 | Stop reason: HOSPADM

## 2024-10-25 RX ORDER — LIDOCAINE HYDROCHLORIDE 10 MG/ML
0.5 INJECTION, SOLUTION EPIDURAL; INFILTRATION; INTRACAUDAL; PERINEURAL ONCE AS NEEDED
Status: DISCONTINUED | OUTPATIENT
Start: 2024-10-25 | End: 2024-10-25 | Stop reason: HOSPADM

## 2024-10-25 RX ORDER — SODIUM CHLORIDE, SODIUM LACTATE, POTASSIUM CHLORIDE, CALCIUM CHLORIDE 600; 310; 30; 20 MG/100ML; MG/100ML; MG/100ML; MG/100ML
100 INJECTION, SOLUTION INTRAVENOUS CONTINUOUS PRN
Status: DISCONTINUED | OUTPATIENT
Start: 2024-10-25 | End: 2024-10-25 | Stop reason: HOSPADM

## 2024-10-25 RX ADMIN — Medication 10 MG: at 07:41

## 2024-10-25 RX ADMIN — HYDROMORPHONE HYDROCHLORIDE 1 MG: 1 INJECTION, SOLUTION INTRAMUSCULAR; INTRAVENOUS; SUBCUTANEOUS at 17:09

## 2024-10-25 RX ADMIN — SODIUM CHLORIDE, POTASSIUM CHLORIDE, SODIUM LACTATE AND CALCIUM CHLORIDE 1000 ML: 600; 310; 30; 20 INJECTION, SOLUTION INTRAVENOUS at 06:23

## 2024-10-25 RX ADMIN — ATORVASTATIN CALCIUM 40 MG: 40 TABLET, FILM COATED ORAL at 21:12

## 2024-10-25 RX ADMIN — HYDROCODONE BITARTRATE AND ACETAMINOPHEN 1 TABLET: 10; 325 TABLET ORAL at 21:12

## 2024-10-25 RX ADMIN — DROPERIDOL 0.62 MG: 2.5 INJECTION, SOLUTION INTRAMUSCULAR; INTRAVENOUS at 09:55

## 2024-10-25 RX ADMIN — SODIUM CHLORIDE 75 ML/HR: 9 INJECTION, SOLUTION INTRAVENOUS at 11:19

## 2024-10-25 RX ADMIN — SUGAMMADEX 200 MG: 100 INJECTION, SOLUTION INTRAVENOUS at 09:04

## 2024-10-25 RX ADMIN — CEFAZOLIN 2000 MG: 2 INJECTION, POWDER, FOR SOLUTION INTRAMUSCULAR; INTRAVENOUS at 07:33

## 2024-10-25 RX ADMIN — FAMOTIDINE 20 MG: 10 INJECTION INTRAVENOUS at 21:12

## 2024-10-25 RX ADMIN — SERTRALINE HYDROCHLORIDE 100 MG: 100 TABLET, FILM COATED ORAL at 13:52

## 2024-10-25 RX ADMIN — EMPAGLIFLOZIN 25 MG: 25 TABLET, FILM COATED ORAL at 15:10

## 2024-10-25 RX ADMIN — Medication 1000 UNITS: at 13:52

## 2024-10-25 RX ADMIN — Medication 10 MG: at 07:39

## 2024-10-25 RX ADMIN — BUSPIRONE HYDROCHLORIDE 15 MG: 10 TABLET ORAL at 21:12

## 2024-10-25 RX ADMIN — LIDOCAINE HYDROCHLORIDE 100 MG: 20 INJECTION, SOLUTION EPIDURAL; INFILTRATION; INTRACAUDAL; PERINEURAL at 07:27

## 2024-10-25 RX ADMIN — FENTANYL CITRATE 50 MCG: 50 INJECTION, SOLUTION INTRAMUSCULAR; INTRAVENOUS at 08:18

## 2024-10-25 RX ADMIN — AMLODIPINE BESYLATE 5 MG: 5 TABLET ORAL at 13:52

## 2024-10-25 RX ADMIN — FENTANYL CITRATE 50 MCG: 50 INJECTION, SOLUTION INTRAMUSCULAR; INTRAVENOUS at 08:40

## 2024-10-25 RX ADMIN — Medication 10 MG: at 07:43

## 2024-10-25 RX ADMIN — FENTANYL CITRATE 50 MCG: 50 INJECTION, SOLUTION INTRAMUSCULAR; INTRAVENOUS at 07:50

## 2024-10-25 RX ADMIN — ACETAMINOPHEN 1000 MG: 500 TABLET, FILM COATED ORAL at 06:46

## 2024-10-25 RX ADMIN — METFORMIN HYDROCHLORIDE 500 MG: 500 TABLET ORAL at 13:52

## 2024-10-25 RX ADMIN — PROPOFOL 50 MG: 10 INJECTION, EMULSION INTRAVENOUS at 07:56

## 2024-10-25 RX ADMIN — DEXAMETHASONE SODIUM PHOSPHATE 4 MG: 4 INJECTION INTRA-ARTICULAR; INTRALESIONAL; INTRAMUSCULAR; INTRAVENOUS; SOFT TISSUE at 07:55

## 2024-10-25 RX ADMIN — GLIPIZIDE 5 MG: 5 TABLET ORAL at 18:45

## 2024-10-25 RX ADMIN — FAMOTIDINE 20 MG: 20 TABLET, FILM COATED ORAL at 13:52

## 2024-10-25 RX ADMIN — ONDANSETRON 4 MG: 2 INJECTION INTRAMUSCULAR; INTRAVENOUS at 09:01

## 2024-10-25 RX ADMIN — FENTANYL CITRATE 50 MCG: 50 INJECTION, SOLUTION INTRAMUSCULAR; INTRAVENOUS at 08:03

## 2024-10-25 RX ADMIN — OXYCODONE HYDROCHLORIDE 20 MG: 20 TABLET, FILM COATED, EXTENDED RELEASE ORAL at 06:42

## 2024-10-25 RX ADMIN — METFORMIN HYDROCHLORIDE 500 MG: 500 TABLET ORAL at 18:45

## 2024-10-25 RX ADMIN — PROPOFOL 150 MG: 10 INJECTION, EMULSION INTRAVENOUS at 07:27

## 2024-10-25 RX ADMIN — ROCURONIUM BROMIDE 50 MG: 10 INJECTION, SOLUTION INTRAVENOUS at 07:27

## 2024-10-25 RX ADMIN — Medication 3 ML: at 13:53

## 2024-10-25 RX ADMIN — HYDROMORPHONE HYDROCHLORIDE 0.5 MG: 1 INJECTION, SOLUTION INTRAMUSCULAR; INTRAVENOUS; SUBCUTANEOUS at 09:50

## 2024-10-25 RX ADMIN — OXYCODONE AND ACETAMINOPHEN 1 TABLET: 325; 10 TABLET ORAL at 10:25

## 2024-10-25 RX ADMIN — TERAZOSIN HYDROCHLORIDE 2 MG: 2 CAPSULE ORAL at 21:20

## 2024-10-25 RX ADMIN — ROCURONIUM BROMIDE 25 MG: 10 INJECTION, SOLUTION INTRAVENOUS at 07:56

## 2024-10-25 RX ADMIN — FENTANYL CITRATE 100 MCG: 50 INJECTION, SOLUTION INTRAMUSCULAR; INTRAVENOUS at 07:27

## 2024-10-25 RX ADMIN — FENTANYL CITRATE 50 MCG: 50 INJECTION, SOLUTION INTRAMUSCULAR; INTRAVENOUS at 08:14

## 2024-10-25 RX ADMIN — CYCLOBENZAPRINE 10 MG: 10 TABLET, FILM COATED ORAL at 21:12

## 2024-10-25 RX ADMIN — FENTANYL CITRATE 50 MCG: 50 INJECTION, SOLUTION INTRAMUSCULAR; INTRAVENOUS at 08:52

## 2024-10-25 RX ADMIN — Medication 10 MG: at 07:37

## 2024-10-25 RX ADMIN — Medication 10 MG: at 07:35

## 2024-10-25 NOTE — ANESTHESIA POSTPROCEDURE EVALUATION
"Patient: Elia Ryan    Procedure Summary       Date: 10/25/24 Room / Location: [facility] OR  /  [facility] OR    Anesthesia Start: 0723 Anesthesia Stop: 0934    Procedures:       ANTERIOR DECOMPRESSION, ANTERIOR LUMBAR INTERBODY FUSION WITH INSTRUMENTATION L5-S1 (Spine Lumbar)      ANTERIOR LUMBAR EXPOSURE (Abdomen) Diagnosis: (M54.16)    Surgeons: GUERA Arteaga MD; Gonzales Hilton DO Provider: Cony Parikh CRNA    Anesthesia Type: general ASA Status: 2            Anesthesia Type: general    Vitals  Vitals Value Taken Time   /70 10/25/24 1040   Temp 97.9 °F (36.6 °C) 10/25/24 1040   Pulse 74 10/25/24 1044   Resp 12 10/25/24 1040   SpO2 97 % 10/25/24 1044   Vitals shown include unfiled device data.        Post Anesthesia Care and Evaluation    Patient location during evaluation: PACU  Patient participation: complete - patient participated  Level of consciousness: awake and alert  Pain management: adequate    Airway patency: patent  Anesthetic complications: No anesthetic complications    Cardiovascular status: acceptable  Respiratory status: acceptable  Hydration status: acceptable    Comments: Blood pressure 124/71, pulse 86, temperature 97.9 °F (36.6 °C), temperature source Oral, resp. rate 15, height 185 cm (72.84\"), weight 109 kg (239 lb 4.8 oz), SpO2 92%.    Pt discharged from PACU based on stephen score >8    "

## 2024-10-25 NOTE — ANESTHESIA PROCEDURE NOTES
Airway  Date/Time: 10/25/2024 7:32 AM  Airway not difficult    General Information and Staff    Patient location during procedure: OR  CRNA/CAA: Cony Parikh CRNA    Indications and Patient Condition  Indications for airway management: airway protection    Preoxygenated: yes  Mask difficulty assessment: 2 - vent by mask + OA or adjuvant +/- NMBA    Final Airway Details  Final airway type: endotracheal airway      Successful airway: ETT  Cuffed: yes   Successful intubation technique: video laryngoscopy  Facilitating devices/methods: intubating stylet and anterior pressure/BURP  Endotracheal tube insertion site: oral  Blade: Dos Santos  Blade size: 4  ETT size (mm): 7.5  Cormack-Lehane Classification: grade IIb - view of arytenoids or posterior of glottis only  Placement verified by: chest auscultation   Cuff volume (mL): 7  Measured from: teeth  ETT/EBT  to teeth (cm): 21  Number of attempts at approach: 1  Assessment: lips, teeth, and gum same as pre-op and atraumatic intubation    Additional Comments  Narrow mouth opening, protruding teeth, limited neck ROM

## 2024-10-25 NOTE — PLAN OF CARE
Goal Outcome Evaluation:  Plan of Care Reviewed With: patient           Outcome Evaluation: PT eval complete. He is alert and oriented x 4. Mr. Ryan was education on log rolling, spinal precautions and LSO use/flako/doff. He stand and ambulates with SBA from therapy. Ambulates ~ 150 ft. Reports being at his baseline mobility and he had no further PT needs, questions or concerns. PT to sign off. Therapy will re-eval after 2nd part back surgery next week.    Anticipated Discharge Disposition (PT): other (see comments) (will assess needs after 2nd and 3rd part back sx)

## 2024-10-25 NOTE — OP NOTE
Elia Ryan  10/25/2024     PREOPERATIVE DIAGNOSIS:   1. Increasing chronic low back pain   2. Bilateral buttock, thigh, and leg radiculopathy, right worse than left   3. Neurogenic claudication   4. Multilevel lumbar degenerative disc disease L1-S1   5. Multilevel lumbar facet arthropathy, severe L4-5   6. Congenital short pedicle syndrome   7. Degenerative scoliosis, concave right L4-5   8. Retrolisthesis L1-2, L2-3, L3-4  9. Spondylolisthesis L4-5  10. Facet cyst left L4-5   11. Central and foraminal stenosis L3-S1, worse central L4-5     POSTOPERATIVE DIAGNOSIS: same     PROCEDURE PERFORMED:   1.  Anterior lumbar interbody fusion of L5-S1 with instrumentation     SURGEON: Gonzales Hilton DO   COSURGEON: Al Arteaga MD     ANESTHESIA: General.    PREPARATION: Routine.    STAFF: Circulator: Therese Zepeda RN; Erickson Ortiz RN  Physician Assistant: Folyd Cano PA-C  Scrub Person: Sarah Hanna; Sarah Barros CST; Christelle Busch  Vendor Representative: Ethan Dixon; Chaparro Pike    ESTIMATED BLOOD LOSS: 25 mL    SPECIMENS: None    COMPLICATIONS: None    INDICATIONS: Elia Ryan is a 59 y.o. male who you are currently following for chronic back pain.  Elia Ryanis scheduled for anterior lumbar interbody fusion of L5-S1 with Dr. Arteaga on 10/30/24.  The patient denies any history of DVT. The indications, risks, and possible complications of the procedure were explained to the patient, who voiced understanding and wished to proceed with surgery.     PROCEDURE IN DETAIL:   The patient was taken to the operating room and placed on the operating table in a supine position. After general anesthesia was obtained, the abdomen was prepped and draped in a sterile manner.  A transverse incision was then made in the left lower quadrant.  Careful dissection was made down through the subcutaneous tissues using the Bovie cautery to ensure hemostasis.  Any crossing veins were ligated with  3-0 silk suture and hemoclips.  The rectus fascia was identified.  It was incised with the Bovie cautery.  Kocher clamps are placed on each side of the rectus fascia and subfascial planes were established in a cephalad and caudad direction.  Once the subfascial planes were established the attention was then turned to the left rectus muscle.  Blunt mobilization was made of the left rectus muscle including its blood supply medially and to the right.  Once it was fully mobilized the attention was then turned laterally to the peritoneal reflection.  Entrance into the retroperitoneal space was established with the use of a sponge stick and the Bovie cautery.  Continued blunt mobilization was made with my hand using a finger sweeping motion moving the peritoneal contents and sacral fat pad medially and to the right.  Once it was fully mobilized the Brau-Bey retractor system was set up.  The retractor blades were set in place.  The left iliac vein was then carefully dissected free and placed safely behind the retractor blade.  The sacral vessels were carefully taken down with hemoclips.  At this point full exposure was established of the L5-S1 disc space.  The next part of the case will be dictated by Dr. Arteaga.  Upon completion of Dr. Arteaga's part of the case the wound bed was irrigated with antibiotic saline and hemostasis was observed.  The retractor blades were carefully taken out one at a time.  The structures were then placed back in their normal anatomic positions.  The rectus fascia was then closed with a #1 PDS in a running fashion to meet in the midline.  The deep layers were closed with a 2-0 Vicryl in a running fashion.  The subcutaneous layers were closed with a 3-0 Vicryl in a running fashion.  The skin was then reapproximated using a 4-0 Monocryl in a subcuticular fashion.  The wound was then cleaned.  Sterile dressings were applied. The patient tolerated the procedure well. Sponge and needle counts  were correct. The patient was then awakened and extubated in the operating room and taken to the recovery room in good condition.    Gonzales Hilton, DO    Date: 10/25/2024 Time: 09:09 CDT
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
## 2024-10-25 NOTE — PLAN OF CARE
Goal Outcome Evaluation:  Plan of Care Reviewed With: patient           Outcome Evaluation: OT eval completed. Pt presents alert and oriented x4, folwers in bed. He reports at baseline being independent with all adls and mobility. Today he was educated on spinal precautions, LSO mgt/fitting/wear schedule, and adl modifications. Today he was able to bring self to sitting at EOB with CGA and vcs while demonstrating proper body mechanics. Adjusted B socks, seated at EOB with CGA. Able to perform sit <> stand t/f from EOB and all functional mobility with CGA. Pt was left sitting up in chair at end of session. At this time, he does not display deficits which require skilled OT services. OT to sign off. Will re-eval s/p 2nd part sx on 10/28.    Anticipated Discharge Disposition (OT): home with assist

## 2024-10-25 NOTE — PLAN OF CARE
Goal Outcome Evaluation:      Pt Aox4. VSS on 1L NC. C/o pain managed with prn meds. Up standby with LSO brace. Spouse is a retired nurse, at bedside assisting pt. IVF and abx infused per orders. Pt voiding well. Tolerating diet. Dressing cdi. Safety maintained.

## 2024-10-25 NOTE — ANESTHESIA PREPROCEDURE EVALUATION
Anesthesia Evaluation     no history of anesthetic complications:   NPO Solid Status: > 8 hours  NPO Liquid Status: > 8 hours           Airway   Mallampati: II  No difficulty expected  Dental          Pulmonary    (+) a smoker (quit 8/2024) Former,  Cardiovascular   Exercise tolerance: poor (<4 METS)    (+) hypertension, hyperlipidemia  (-) CAD      Neuro/Psych  (+) psychiatric history Anxiety, Depression and PTSD  (-) seizures, TIA, CVA  GI/Hepatic/Renal/Endo    (+) diabetes mellitus (borderline) type 2  (-) liver disease, no renal disease    Musculoskeletal     Abdominal    Substance History      OB/GYN          Other   arthritis,                 Anesthesia Plan    ASA 2     general     intravenous induction     Anesthetic plan, risks, benefits, and alternatives have been provided, discussed and informed consent has been obtained with: patient.    CODE STATUS:

## 2024-10-25 NOTE — OP NOTE
LUMBAR ANTERIOR INTERBODY FUSION  Procedure Note    Elia Ryan  10/25/2024    Pre-op Diagnosis:    1. Increasing chronic low back pain   2. Bilateral buttock, thigh, and leg radiculopathy, right worse than left   3. Neurogenic claudication   4. Multilevel lumbar degenerative disc disease L1-S1   5. Multilevel lumbar facet arthropathy, severe L4-5   6. Congenital short pedicle syndrome   7. Degenerative scoliosis, concave right L4-5   8. Retrolisthesis L1-2, L2-3, L3-4  9. Spondylolisthesis L4-5  10. Facet cyst left L4-5   11. Central and foraminal stenosis L3-S1, worse central L4-5    Post-op Diagnosis:    same    Procedure/CPT® Codes:     1. Anterior discectomy decompression with bilateral neural foraminotomy L5-S1  2. Anterior lumbar interbody fusion L5-S1  3. Anterior spinal instrumentation L5-S1 (ATEC anterior plate and screws)  4. Use of titanium interbody biomechanical device for fusion L5-S1 (ATEC titanium spacer and screws)  5. Use of allograft bone matrix for fusion L5-S1 (Induce NMP Fibers)  6. Use of fluoroscopy for confirmation of surgical level, placement of interbody spacer and instrumentation  7. Intraoperative neural monitoring     Anesthesia: General     Surgeon: CHERELLE Arteaga MD     Co Surgeon: Dr. Gonzales Hilton D.O.     Assistant: Floyd Cano PA-C     Estimated Blood Loss: 25 mL     Complications: None     Condition: Stable to PACU.     Indications:     The patient is a 59-year-old who sees practitioners at the [facility] for medical issues.  They presented to the office with increasing chronic low back pain along with bilateral buttock, thigh, and leg radiculopathy, the right side worse than the left along with symptoms consistent with neurogenic claudication.  Imaging studies revealed multilevel lumbar degenerative disc disease and facet arthropathy along with congenital short pedicle syndrome and degenerative scoliosis concave to the right at L4-5.  He was noted to have  retrolisthesis at L1-2, L2-3, and L3-4 and a spondylolisthesis at L4-5 associated with a facet cyst on the left.  These findings created central and foraminal stenosis mainly from L3-S1 that was worse centrally at L4-5.    After failing all conservative measures, it was mutually decided that surgery would be the best option.  Risks, benefits, and complications of surgery were discussed in detail. The patient appeared well informed and wished to proceed. We specifically discussed the risk of infection, blood loss, nerve root injury, CSF leak, and the possibility of incomplete resolution of symptoms. We also discussed the possible risk of a nonunion and the potential need for additional surgery in the event of a pseudoarthrosis or hardware failure.    We elected to proceed with a staged operation.  Today we are performing an anterior decompression and fusion of L5-S1, it is planned that we will be returning to surgery for a second posterior procedure at a later date.     Operative Procedure:     After obtaining informed consent and verifying the correct operative level, the patient was brought to the operating room and placed supine on an operating table. A general anesthetic was provided by the anesthesia service with the assistance of an endotracheal tube. Once this was appropriately positioned and secured, the anterior abdominal region was prepped and draped in usual sterile fashion. A surgical timeout was taken to confirm this was the correct patient, we were working at the correct level, and that preoperative antibiotics were given in a timely fashion.     At this point, Dr. Gonzales Hilton D.O. provided vascular access to the L5-S1 level. He performed a left sided anterior retroperitoneal approach to the L5-S1 segment. Please see his separate dictated operative report regarding the details on the approach itself.  When I entered the procedure, self retaining retractors were already in position with excellent  exposure of the L5-S1 disc space.     After confirming we were at the correct level using fluoroscopy, I used a long handle 10 blade scalpel to cut into the L5-S1 disc space. A Rivas elevator was used to remove disc material off of the endplates. Disc material was retrieved using pituitaries and Kerrisons. A disc space distractor was then placed into the L5-S1 disc space and I used curettes to remove posterior disc material. Kerrisons were used to remove posterior osteophytes across the endplates of L5 and S1. There was high-grade stenosis centrally but also foraminally. I then performed a bilateral neural foraminotomy with Kerrisons and curettes. The decompression was much more involved than what is usually required for an anterior lumbar interbody fusion by itself and required significantly more time to perform.  This was due to the severe disc space collapse and high-grade foraminal stenosis.     After the decompression was completed bilaterally and centrally, I used a series of endplate scrapers to prepare the endplates for interbody fusion. Trial spacers were then malleted into position and it was felt that an 18 mm anterior height titanium spacer with 20 degrees lordosis from the ATEC instrumentation set would be the best fit to restore disc height also restoring foraminal height and providing some indirect decompression. The disc space was then thoroughly irrigated with saline solution.  Gelfoam powder with thrombin was used to control epidural bleeding.     An 18 mm titanium spacer from the ATEC instrumentation set was then packed as tightly as possible with an allograft bone matrix called Induce NMP Fibers. This spacer was then malleted into the L5-S1 disc space under fluoroscopic guidance. It was placed as an interbody biomechanical device to assist with fusion.  I then placed an 8.5 mm x 30 mm graft bolt through the spacer into L5 and a 5.0 mm x 30 mm screw through the spacer into S1.  The screws were  placed to help maintain position of the spacer as well as to assist with the fusion process.     A 4-hole anterior plate from the [facility] instrumentation set was then chosen. The 4 holes were drilled and four 6.0 mm x 30 mm screws were used to fix the plate across the L5-S1 segment augmenting the fusion.      We then inspected the entire operative field for any signs of bleeding.  Bleeding was once again controlled using thrombin with Gelfoam powder and bipolar cautery.  Once we ensured that adequate hemostasis was accomplished, final fluoroscopy imaging was taken to confirm adequate position of our implants. There was excellent restoration of disc height and the plate and screw construct appeared to be adequately positioned across L5-S1.    I placed a VersaWrap over the anterior instrumentation to hopefully decrease the risk of postoperative adhesions and scarring.     Please see Dr. Gonzales Hilton's separate dictated operative report regarding the closure of the wound. Once this was accomplished, the patient was extubated and sent to the recovery room in good stable condition. We estimated blood loss to be approximately 25 mL and the patient remained hemodynamically stable during the procedure.     Intraoperative neuro monitoring was ordered and carried out throughout the procedure to add an increased level of safety for the patient.  The interpreting physician was available by means of real-time continuous, bidirectional, remote audio and visual communication as needed throughout the entire procedure.  Modalities used during the procedure included SSEP, EEG, MEP, EMG, and TOF.  There were no neuro monitoring signal changes during the procedure.    Dr. Gonzales Hilton D.O. provided vascular access to the L5-S1 level and acted as a co-surgeon in this fashion.  Floyd Cano PA-C provided critical assistance during the decompression at L5-S1 as well as during the placement of the titanium spacer, bone graft and  instrumentation to obtain a fusion across L5-S1.    CHERELLE Arteaga MD    Date: 10/25/2024  Time: 10:40 CDT

## 2024-10-26 LAB
ANION GAP SERPL CALCULATED.3IONS-SCNC: 14 MMOL/L (ref 5–15)
BASOPHILS # BLD AUTO: 0.03 10*3/MM3 (ref 0–0.2)
BASOPHILS NFR BLD AUTO: 0.3 % (ref 0–1.5)
BUN SERPL-MCNC: 16 MG/DL (ref 8–23)
BUN/CREAT SERPL: 26.2 (ref 7–25)
CALCIUM SPEC-SCNC: 8.7 MG/DL (ref 8.6–10.5)
CHLORIDE SERPL-SCNC: 101 MMOL/L (ref 98–107)
CO2 SERPL-SCNC: 25 MMOL/L (ref 22–29)
CREAT SERPL-MCNC: 0.61 MG/DL (ref 0.76–1.27)
DEPRECATED RDW RBC AUTO: 41.4 FL (ref 37–54)
EGFRCR SERPLBLD CKD-EPI 2021: 109.3 ML/MIN/1.73
EOSINOPHIL # BLD AUTO: 0.03 10*3/MM3 (ref 0–0.4)
EOSINOPHIL NFR BLD AUTO: 0.3 % (ref 0.3–6.2)
ERYTHROCYTE [DISTWIDTH] IN BLOOD BY AUTOMATED COUNT: 12.8 % (ref 12.3–15.4)
GLUCOSE SERPL-MCNC: 131 MG/DL (ref 65–99)
HCT VFR BLD AUTO: 34 % (ref 37.5–51)
HGB BLD-MCNC: 11.4 G/DL (ref 13–17.7)
IMM GRANULOCYTES # BLD AUTO: 0.07 10*3/MM3 (ref 0–0.05)
IMM GRANULOCYTES NFR BLD AUTO: 0.6 % (ref 0–0.5)
LYMPHOCYTES # BLD AUTO: 1.41 10*3/MM3 (ref 0.7–3.1)
LYMPHOCYTES NFR BLD AUTO: 11.9 % (ref 19.6–45.3)
MCH RBC QN AUTO: 29.7 PG (ref 26.6–33)
MCHC RBC AUTO-ENTMCNC: 33.5 G/DL (ref 31.5–35.7)
MCV RBC AUTO: 88.5 FL (ref 79–97)
MONOCYTES # BLD AUTO: 1.13 10*3/MM3 (ref 0.1–0.9)
MONOCYTES NFR BLD AUTO: 9.6 % (ref 5–12)
NEUTROPHILS NFR BLD AUTO: 77.3 % (ref 42.7–76)
NEUTROPHILS NFR BLD AUTO: 9.13 10*3/MM3 (ref 1.7–7)
NRBC BLD AUTO-RTO: 0 /100 WBC (ref 0–0.2)
PLATELET # BLD AUTO: 233 10*3/MM3 (ref 140–450)
PMV BLD AUTO: 9.5 FL (ref 6–12)
POTASSIUM SERPL-SCNC: 4.3 MMOL/L (ref 3.5–5.2)
RBC # BLD AUTO: 3.84 10*6/MM3 (ref 4.14–5.8)
SODIUM SERPL-SCNC: 140 MMOL/L (ref 136–145)
WBC NRBC COR # BLD AUTO: 11.8 10*3/MM3 (ref 3.4–10.8)

## 2024-10-26 PROCEDURE — 80048 BASIC METABOLIC PNL TOTAL CA: CPT | Performed by: PHYSICIAN ASSISTANT

## 2024-10-26 PROCEDURE — 85025 COMPLETE CBC W/AUTO DIFF WBC: CPT | Performed by: PHYSICIAN ASSISTANT

## 2024-10-26 RX ORDER — GABAPENTIN 300 MG/1
600 CAPSULE ORAL 3 TIMES DAILY
COMMUNITY

## 2024-10-26 RX ORDER — AMLODIPINE BESYLATE 10 MG/1
10 TABLET ORAL DAILY
COMMUNITY

## 2024-10-26 RX ORDER — ATORVASTATIN CALCIUM 40 MG/1
20 TABLET, FILM COATED ORAL DAILY
COMMUNITY

## 2024-10-26 RX ADMIN — ATORVASTATIN CALCIUM 40 MG: 40 TABLET, FILM COATED ORAL at 21:13

## 2024-10-26 RX ADMIN — SERTRALINE HYDROCHLORIDE 100 MG: 100 TABLET, FILM COATED ORAL at 09:36

## 2024-10-26 RX ADMIN — Medication 3 ML: at 21:16

## 2024-10-26 RX ADMIN — CYCLOBENZAPRINE 10 MG: 10 TABLET, FILM COATED ORAL at 12:50

## 2024-10-26 RX ADMIN — METFORMIN HYDROCHLORIDE 500 MG: 500 TABLET ORAL at 09:36

## 2024-10-26 RX ADMIN — METFORMIN HYDROCHLORIDE 500 MG: 500 TABLET ORAL at 17:56

## 2024-10-26 RX ADMIN — GLIPIZIDE 5 MG: 5 TABLET ORAL at 09:37

## 2024-10-26 RX ADMIN — Medication 1000 UNITS: at 09:37

## 2024-10-26 RX ADMIN — CYCLOBENZAPRINE 10 MG: 10 TABLET, FILM COATED ORAL at 21:13

## 2024-10-26 RX ADMIN — HYDROCODONE BITARTRATE AND ACETAMINOPHEN 1 TABLET: 10; 325 TABLET ORAL at 17:56

## 2024-10-26 RX ADMIN — EMPAGLIFLOZIN 25 MG: 25 TABLET, FILM COATED ORAL at 09:36

## 2024-10-26 RX ADMIN — BUSPIRONE HYDROCHLORIDE 15 MG: 10 TABLET ORAL at 09:36

## 2024-10-26 RX ADMIN — GLIPIZIDE 5 MG: 5 TABLET ORAL at 17:56

## 2024-10-26 RX ADMIN — FAMOTIDINE 20 MG: 10 INJECTION INTRAVENOUS at 21:13

## 2024-10-26 RX ADMIN — FAMOTIDINE 20 MG: 20 TABLET, FILM COATED ORAL at 09:37

## 2024-10-26 RX ADMIN — HYDROMORPHONE HYDROCHLORIDE 1 MG: 1 INJECTION, SOLUTION INTRAMUSCULAR; INTRAVENOUS; SUBCUTANEOUS at 12:51

## 2024-10-26 RX ADMIN — AMLODIPINE BESYLATE 5 MG: 5 TABLET ORAL at 09:36

## 2024-10-26 RX ADMIN — GABAPENTIN 400 MG: 400 CAPSULE ORAL at 09:37

## 2024-10-26 RX ADMIN — Medication 3 ML: at 12:51

## 2024-10-26 RX ADMIN — HYDROMORPHONE HYDROCHLORIDE 1 MG: 1 INJECTION, SOLUTION INTRAMUSCULAR; INTRAVENOUS; SUBCUTANEOUS at 21:13

## 2024-10-26 RX ADMIN — TERAZOSIN HYDROCHLORIDE 2 MG: 2 CAPSULE ORAL at 21:13

## 2024-10-26 RX ADMIN — HYDROCODONE BITARTRATE AND ACETAMINOPHEN 1 TABLET: 10; 325 TABLET ORAL at 09:36

## 2024-10-26 RX ADMIN — BUSPIRONE HYDROCHLORIDE 15 MG: 10 TABLET ORAL at 21:13

## 2024-10-26 RX ADMIN — HYDROMORPHONE HYDROCHLORIDE 1 MG: 1 INJECTION, SOLUTION INTRAMUSCULAR; INTRAVENOUS; SUBCUTANEOUS at 02:29

## 2024-10-26 NOTE — PROGRESS NOTES
Elia Ryan  59 y.o.  male  Subjective     Post-Operative Day # 1    Systemic or Specific Complaints:     No complaints at this time.  Pain well-controlled with medication.  Patient states that he ambulated a small amount yesterday without significant difficulty.  Patient will continue to work with physical therapy.  Plan next stage on Monday.    Objective     Vital signs in last 24 hours:  Temp:  [97.7 °F (36.5 °C)-98.1 °F (36.7 °C)] 98.1 °F (36.7 °C)  Heart Rate:  [66-93] 71  Resp:  [9-18] 18  BP: (113-176)/(59-89) 113/59    Physical Exam:    Alert and oriented ×3, no acute distress, grossly neurovascularly intact, vital signs stable, dressing clean dry and intact, moving all extremities without focal deficit    Diagnostic Data:  CBC:  Results from last 7 days   Lab Units 10/26/24  0259   WBC 10*3/mm3 11.80*   RBC 10*6/mm3 3.84*   HEMOGLOBIN g/dL 11.4*   HEMATOCRIT % 34.0*   PLATELETS 10*3/mm3 233          Assessment & Plan     1. Increasing chronic low back pain   2. Bilateral buttock, thigh, and leg radiculopathy, right worse than left   3. Neurogenic claudication   4. Multilevel lumbar degenerative disc disease L1-S1   5. Multilevel lumbar facet arthropathy, severe L4-5   6. Congenital short pedicle syndrome   7. Degenerative scoliosis, concave right L4-5   8. Retrolisthesis L1-2, L2-3, L3-4  9. Spondylolisthesis L4-5  10. Facet cyst left L4-5   11. Central and foraminal stenosis L3-S1, worse central L4-5  12.  Status post anterior discectomy decompression with bilateral neural foraminotomy L5-S1, ALIF with instrumentation L5-S1, 10/25/2024    Plan:  1. PT/OT/Brace  2. Periops  3.  Plan to stay through the weekend and perform second stage on Monday.          Floyd Cano PA-C    Date: 10/26/2024  Time: 07:29 CDT

## 2024-10-26 NOTE — PLAN OF CARE
Goal Outcome Evaluation:  Plan of Care Reviewed With: patient      Pt A&Ox4. VSS. O2 1L via NC. Abd dressing dry, intact, with scant dried drainage marked. Prn pain meds given. Up SBA with brace. Call light in reach. Safety maintained. Continue plan of care.

## 2024-10-26 NOTE — PLAN OF CARE
Goal Outcome Evaluation:  Plan of Care Reviewed With: patient        Progress: improving  Outcome Evaluation: AOX4, VSS on RA. PPP, abd dressing; marked dried drainage. C/o pain, PRN pain medication given with good relief provided. LSO brave worn OOB. IVF completed, good oral intake. Safety precautions maintained with call light with in reach.

## 2024-10-27 RX ADMIN — METFORMIN HYDROCHLORIDE 500 MG: 500 TABLET ORAL at 17:52

## 2024-10-27 RX ADMIN — ATORVASTATIN CALCIUM 40 MG: 40 TABLET, FILM COATED ORAL at 21:03

## 2024-10-27 RX ADMIN — BUSPIRONE HYDROCHLORIDE 15 MG: 10 TABLET ORAL at 21:03

## 2024-10-27 RX ADMIN — CYCLOBENZAPRINE 10 MG: 10 TABLET, FILM COATED ORAL at 21:03

## 2024-10-27 RX ADMIN — METFORMIN HYDROCHLORIDE 500 MG: 500 TABLET ORAL at 08:14

## 2024-10-27 RX ADMIN — FAMOTIDINE 20 MG: 20 TABLET, FILM COATED ORAL at 08:14

## 2024-10-27 RX ADMIN — EMPAGLIFLOZIN 25 MG: 25 TABLET, FILM COATED ORAL at 08:14

## 2024-10-27 RX ADMIN — AMLODIPINE BESYLATE 5 MG: 5 TABLET ORAL at 08:13

## 2024-10-27 RX ADMIN — HYDROCODONE BITARTRATE AND ACETAMINOPHEN 1 TABLET: 10; 325 TABLET ORAL at 03:59

## 2024-10-27 RX ADMIN — Medication 1000 UNITS: at 08:14

## 2024-10-27 RX ADMIN — Medication 3 ML: at 08:15

## 2024-10-27 RX ADMIN — Medication 3 ML: at 21:04

## 2024-10-27 RX ADMIN — GABAPENTIN 400 MG: 400 CAPSULE ORAL at 08:14

## 2024-10-27 RX ADMIN — HYDROMORPHONE HYDROCHLORIDE 1 MG: 1 INJECTION, SOLUTION INTRAMUSCULAR; INTRAVENOUS; SUBCUTANEOUS at 15:49

## 2024-10-27 RX ADMIN — GLIPIZIDE 5 MG: 5 TABLET ORAL at 08:14

## 2024-10-27 RX ADMIN — HYDROCODONE BITARTRATE AND ACETAMINOPHEN 1 TABLET: 10; 325 TABLET ORAL at 12:05

## 2024-10-27 RX ADMIN — BUSPIRONE HYDROCHLORIDE 15 MG: 10 TABLET ORAL at 08:13

## 2024-10-27 RX ADMIN — HYDROCODONE BITARTRATE AND ACETAMINOPHEN 1 TABLET: 10; 325 TABLET ORAL at 21:03

## 2024-10-27 RX ADMIN — GLIPIZIDE 5 MG: 5 TABLET ORAL at 17:52

## 2024-10-27 RX ADMIN — FAMOTIDINE 20 MG: 20 TABLET, FILM COATED ORAL at 21:03

## 2024-10-27 RX ADMIN — TERAZOSIN HYDROCHLORIDE 2 MG: 2 CAPSULE ORAL at 21:03

## 2024-10-27 RX ADMIN — SERTRALINE HYDROCHLORIDE 100 MG: 100 TABLET, FILM COATED ORAL at 08:14

## 2024-10-27 NOTE — PLAN OF CARE
Goal Outcome Evaluation:  Plan of Care Reviewed With: patient   Pt A&Ox4. VSS. RA. Prn meds given for pain. NPO at MN. Surgery part 2 scheduled for tomorrow. Consent signed. Visitors at bedside this afternoon. Call light in reach. Safety maintained. Continue plan of care.

## 2024-10-27 NOTE — PLAN OF CARE
Goal Outcome Evaluation:  Plan of Care Reviewed With: patient        Progress: improving  Outcome Evaluation: AOx4, VSS on RA. Pain controlled on PRN pain medication. PPP, good pumps/. Abd. dressing reinforced with medipore tape and gauze. No new s/s of n/t. Voiding without issues, urinals at the bedside. Plan of care continues with surgery part two on monday. BM 10/26/24. Safety precautions maintained [facility] call light within reach.

## 2024-10-27 NOTE — PROGRESS NOTES
Elia Ryan  59 y.o.  male  Subjective     Post-Operative Day # 2    Systemic or Specific Complaints:     No complaints this time.  Pain well-controlled medication.  Ambulating well.  Plan for second stage tomorrow.    Objective     Vital signs in last 24 hours:  Temp:  [98 °F (36.7 °C)-98.8 °F (37.1 °C)] 98.4 °F (36.9 °C)  Heart Rate:  [70-79] 79  Resp:  [14-18] 14  BP: (117-146)/(60-76) 130/66    Physical Exam:    Alert and oriented ×3, no acute distress, grossly neurovascularly intact, vital signs stable, dressing clean dry and intact, moving all extremities without focal deficit    Diagnostic Data:  CBC:  Results from last 7 days   Lab Units 10/26/24  0259   WBC 10*3/mm3 11.80*   RBC 10*6/mm3 3.84*   HEMOGLOBIN g/dL 11.4*   HEMATOCRIT % 34.0*   PLATELETS 10*3/mm3 233          Assessment & Plan     1. Increasing chronic low back pain   2. Bilateral buttock, thigh, and leg radiculopathy, right worse than left   3. Neurogenic claudication   4. Multilevel lumbar degenerative disc disease L1-S1   5. Multilevel lumbar facet arthropathy, severe L4-5   6. Congenital short pedicle syndrome   7. Degenerative scoliosis, concave right L4-5   8. Retrolisthesis L1-2, L2-3, L3-4  9. Spondylolisthesis L4-5  10. Facet cyst left L4-5   11. Central and foraminal stenosis L3-S1, worse central L4-5  12.  Status post anterior discectomy decompression with bilateral neural foraminotomy L5-S1, ALIF with instrumentation L5-S1, 10/25/2024    Plan:  1. Plan second stage surgery tomorrow  2. Npo after midnight  3. periops / PT/OT/Brace today            Floyd Cano PA-C    Date: 10/27/2024  Time: 07:28 CDT

## 2024-10-28 ENCOUNTER — ANESTHESIA EVENT (OUTPATIENT)
Dept: PERIOP | Facility: HOSPITAL | Age: 59
End: 2024-10-28
Payer: OTHER GOVERNMENT

## 2024-10-28 ENCOUNTER — APPOINTMENT (OUTPATIENT)
Dept: GENERAL RADIOLOGY | Facility: HOSPITAL | Age: 59
DRG: 428 | End: 2024-10-28
Payer: OTHER GOVERNMENT

## 2024-10-28 ENCOUNTER — ANESTHESIA (OUTPATIENT)
Dept: PERIOP | Facility: HOSPITAL | Age: 59
End: 2024-10-28
Payer: OTHER GOVERNMENT

## 2024-10-28 LAB — GLUCOSE BLDC GLUCOMTR-MCNC: 156 MG/DL (ref 70–130)

## 2024-10-28 PROCEDURE — 25010000002 CEFAZOLIN PER 500 MG: Performed by: PHYSICIAN ASSISTANT

## 2024-10-28 PROCEDURE — 76000 FLUOROSCOPY <1 HR PHYS/QHP: CPT

## 2024-10-28 PROCEDURE — 72100 X-RAY EXAM L-S SPINE 2/3 VWS: CPT

## 2024-10-28 PROCEDURE — C1713 ANCHOR/SCREW BN/BN,TIS/BN: HCPCS | Performed by: ORTHOPAEDIC SURGERY

## 2024-10-28 PROCEDURE — 25010000002 LIDOCAINE PF 2% 2 % SOLUTION: Performed by: NURSE ANESTHETIST, CERTIFIED REGISTERED

## 2024-10-28 PROCEDURE — 97168 OT RE-EVAL EST PLAN CARE: CPT

## 2024-10-28 PROCEDURE — 94799 UNLISTED PULMONARY SVC/PX: CPT

## 2024-10-28 PROCEDURE — C1769 GUIDE WIRE: HCPCS | Performed by: ORTHOPAEDIC SURGERY

## 2024-10-28 PROCEDURE — 25010000002 DEXAMETHASONE PER 1 MG: Performed by: NURSE ANESTHETIST, CERTIFIED REGISTERED

## 2024-10-28 PROCEDURE — 25810000003 LACTATED RINGERS PER 1000 ML: Performed by: ANESTHESIOLOGY

## 2024-10-28 PROCEDURE — 25010000002 ONDANSETRON PER 1 MG: Performed by: NURSE ANESTHETIST, CERTIFIED REGISTERED

## 2024-10-28 PROCEDURE — 97116 GAIT TRAINING THERAPY: CPT

## 2024-10-28 PROCEDURE — 82948 REAGENT STRIP/BLOOD GLUCOSE: CPT

## 2024-10-28 PROCEDURE — 25810000003 SODIUM CHLORIDE PER 500 ML: Performed by: ORTHOPAEDIC SURGERY

## 2024-10-28 PROCEDURE — 25010000002 PROPOFOL 10 MG/ML EMULSION: Performed by: NURSE ANESTHETIST, CERTIFIED REGISTERED

## 2024-10-28 PROCEDURE — 97164 PT RE-EVAL EST PLAN CARE: CPT

## 2024-10-28 PROCEDURE — 25810000003 SODIUM CHLORIDE 0.9 % SOLUTION: Performed by: PHYSICIAN ASSISTANT

## 2024-10-28 PROCEDURE — 0SG10A0 FUSION OF 2 OR MORE LUMBAR VERTEBRAL JOINTS WITH INTERBODY FUSION DEVICE, ANTERIOR APPROACH, ANTERIOR COLUMN, OPEN APPROACH: ICD-10-PCS | Performed by: ORTHOPAEDIC SURGERY

## 2024-10-28 PROCEDURE — 25010000002 FENTANYL CITRATE (PF) 50 MCG/ML SOLUTION: Performed by: NURSE ANESTHETIST, CERTIFIED REGISTERED

## 2024-10-28 PROCEDURE — 97530 THERAPEUTIC ACTIVITIES: CPT

## 2024-10-28 PROCEDURE — 0SB20ZZ EXCISION OF LUMBAR VERTEBRAL DISC, OPEN APPROACH: ICD-10-PCS | Performed by: ORTHOPAEDIC SURGERY

## 2024-10-28 DEVICE — X-PAC LATERAL CAGE 22MM X 60MM X 8 - 12 DEGREES
Type: IMPLANTABLE DEVICE | Site: SPINE LUMBAR | Status: FUNCTIONAL
Brand: X-PAC

## 2024-10-28 DEVICE — IDENTITI LIF POROUS TI SPACER, 8 X 22 X 55 MM, 10°
Type: IMPLANTABLE DEVICE | Site: SPINE LUMBAR | Status: FUNCTIONAL
Brand: IDENTITI

## 2024-10-28 DEVICE — AVITENE FLOUR, 1.0 GRAM
Type: IMPLANTABLE DEVICE | Site: SPINE LUMBAR | Status: FUNCTIONAL
Brand: AVITENE

## 2024-10-28 DEVICE — LIF AMP, Ø5.5MM X 35MM BONE SCREW X2
Type: IMPLANTABLE DEVICE | Site: SPINE LUMBAR | Status: FUNCTIONAL
Brand: AMP

## 2024-10-28 DEVICE — PUTTY GRFT BONE CATALYST NANOSYNTHETIC 10CC: Type: IMPLANTABLE DEVICE | Site: SPINE LUMBAR | Status: FUNCTIONAL

## 2024-10-28 DEVICE — LIF AMP, TWO SCREW PLATE, 04 WITH CENTER SCREW, RIGID, GREEN
Type: IMPLANTABLE DEVICE | Site: SPINE LUMBAR | Status: FUNCTIONAL
Brand: LIF AMP

## 2024-10-28 RX ORDER — LIDOCAINE HYDROCHLORIDE 20 MG/ML
INJECTION, SOLUTION EPIDURAL; INFILTRATION; INTRACAUDAL; PERINEURAL AS NEEDED
Status: DISCONTINUED | OUTPATIENT
Start: 2024-10-28 | End: 2024-10-28 | Stop reason: SURG

## 2024-10-28 RX ORDER — FENTANYL CITRATE 50 UG/ML
INJECTION, SOLUTION INTRAMUSCULAR; INTRAVENOUS AS NEEDED
Status: DISCONTINUED | OUTPATIENT
Start: 2024-10-28 | End: 2024-10-28 | Stop reason: SURG

## 2024-10-28 RX ORDER — LABETALOL HYDROCHLORIDE 5 MG/ML
5 INJECTION, SOLUTION INTRAVENOUS
Status: DISCONTINUED | OUTPATIENT
Start: 2024-10-28 | End: 2024-10-28 | Stop reason: HOSPADM

## 2024-10-28 RX ORDER — FENTANYL CITRATE 50 UG/ML
50 INJECTION, SOLUTION INTRAMUSCULAR; INTRAVENOUS
Status: DISCONTINUED | OUTPATIENT
Start: 2024-10-28 | End: 2024-10-28 | Stop reason: HOSPADM

## 2024-10-28 RX ORDER — OXYCODONE AND ACETAMINOPHEN 10; 325 MG/1; MG/1
1 TABLET ORAL EVERY 4 HOURS PRN
Status: DISCONTINUED | OUTPATIENT
Start: 2024-10-28 | End: 2024-10-28 | Stop reason: HOSPADM

## 2024-10-28 RX ORDER — ROCURONIUM BROMIDE 10 MG/ML
INJECTION, SOLUTION INTRAVENOUS AS NEEDED
Status: DISCONTINUED | OUTPATIENT
Start: 2024-10-28 | End: 2024-10-28 | Stop reason: SURG

## 2024-10-28 RX ORDER — SODIUM CHLORIDE 9 MG/ML
INJECTION, SOLUTION INTRAVENOUS AS NEEDED
Status: DISCONTINUED | OUTPATIENT
Start: 2024-10-28 | End: 2024-10-28 | Stop reason: HOSPADM

## 2024-10-28 RX ORDER — SODIUM CHLORIDE, SODIUM LACTATE, POTASSIUM CHLORIDE, CALCIUM CHLORIDE 600; 310; 30; 20 MG/100ML; MG/100ML; MG/100ML; MG/100ML
100 INJECTION, SOLUTION INTRAVENOUS CONTINUOUS PRN
Status: DISCONTINUED | OUTPATIENT
Start: 2024-10-28 | End: 2024-10-28 | Stop reason: HOSPADM

## 2024-10-28 RX ORDER — ONDANSETRON 2 MG/ML
4 INJECTION INTRAMUSCULAR; INTRAVENOUS
Status: DISCONTINUED | OUTPATIENT
Start: 2024-10-28 | End: 2024-10-28 | Stop reason: HOSPADM

## 2024-10-28 RX ORDER — SODIUM CHLORIDE 0.9 % (FLUSH) 0.9 %
3-10 SYRINGE (ML) INJECTION AS NEEDED
Status: DISCONTINUED | OUTPATIENT
Start: 2024-10-28 | End: 2024-10-28 | Stop reason: HOSPADM

## 2024-10-28 RX ORDER — BUPIVACAINE HCL/0.9 % NACL/PF 0.125 %
PLASTIC BAG, INJECTION (ML) EPIDURAL AS NEEDED
Status: DISCONTINUED | OUTPATIENT
Start: 2024-10-28 | End: 2024-10-28 | Stop reason: SURG

## 2024-10-28 RX ORDER — PROPOFOL 10 MG/ML
VIAL (ML) INTRAVENOUS AS NEEDED
Status: DISCONTINUED | OUTPATIENT
Start: 2024-10-28 | End: 2024-10-28 | Stop reason: SURG

## 2024-10-28 RX ORDER — FLUMAZENIL 0.1 MG/ML
0.2 INJECTION INTRAVENOUS AS NEEDED
Status: DISCONTINUED | OUTPATIENT
Start: 2024-10-28 | End: 2024-10-28 | Stop reason: HOSPADM

## 2024-10-28 RX ORDER — SODIUM CHLORIDE 9 MG/ML
40 INJECTION, SOLUTION INTRAVENOUS AS NEEDED
Status: DISCONTINUED | OUTPATIENT
Start: 2024-10-28 | End: 2024-10-28 | Stop reason: HOSPADM

## 2024-10-28 RX ORDER — DROPERIDOL 2.5 MG/ML
0.62 INJECTION, SOLUTION INTRAMUSCULAR; INTRAVENOUS ONCE AS NEEDED
Status: DISCONTINUED | OUTPATIENT
Start: 2024-10-28 | End: 2024-10-28 | Stop reason: HOSPADM

## 2024-10-28 RX ORDER — LIDOCAINE HYDROCHLORIDE 10 MG/ML
0.5 INJECTION, SOLUTION EPIDURAL; INFILTRATION; INTRACAUDAL; PERINEURAL ONCE AS NEEDED
Status: DISCONTINUED | OUTPATIENT
Start: 2024-10-28 | End: 2024-10-28 | Stop reason: HOSPADM

## 2024-10-28 RX ORDER — IBUPROFEN 600 MG/1
600 TABLET, FILM COATED ORAL EVERY 6 HOURS PRN
Status: DISCONTINUED | OUTPATIENT
Start: 2024-10-28 | End: 2024-10-28 | Stop reason: HOSPADM

## 2024-10-28 RX ORDER — MAGNESIUM HYDROXIDE 1200 MG/15ML
LIQUID ORAL AS NEEDED
Status: DISCONTINUED | OUTPATIENT
Start: 2024-10-28 | End: 2024-10-28 | Stop reason: HOSPADM

## 2024-10-28 RX ORDER — ONDANSETRON 2 MG/ML
INJECTION INTRAMUSCULAR; INTRAVENOUS AS NEEDED
Status: DISCONTINUED | OUTPATIENT
Start: 2024-10-28 | End: 2024-10-28 | Stop reason: SURG

## 2024-10-28 RX ORDER — EPHEDRINE SULFATE 50 MG/ML
INJECTION, SOLUTION INTRAVENOUS AS NEEDED
Status: DISCONTINUED | OUTPATIENT
Start: 2024-10-28 | End: 2024-10-28 | Stop reason: SURG

## 2024-10-28 RX ORDER — OXYCODONE HCL 20 MG/1
20 TABLET, FILM COATED, EXTENDED RELEASE ORAL ONCE
Status: COMPLETED | OUTPATIENT
Start: 2024-10-28 | End: 2024-10-28

## 2024-10-28 RX ORDER — SODIUM CHLORIDE 0.9 % (FLUSH) 0.9 %
3 SYRINGE (ML) INJECTION EVERY 12 HOURS SCHEDULED
Status: DISCONTINUED | OUTPATIENT
Start: 2024-10-28 | End: 2024-10-28 | Stop reason: HOSPADM

## 2024-10-28 RX ORDER — HYDROMORPHONE HYDROCHLORIDE 1 MG/ML
0.5 INJECTION, SOLUTION INTRAMUSCULAR; INTRAVENOUS; SUBCUTANEOUS
Status: DISCONTINUED | OUTPATIENT
Start: 2024-10-28 | End: 2024-10-28 | Stop reason: HOSPADM

## 2024-10-28 RX ORDER — DEXAMETHASONE SODIUM PHOSPHATE 4 MG/ML
INJECTION, SOLUTION INTRA-ARTICULAR; INTRALESIONAL; INTRAMUSCULAR; INTRAVENOUS; SOFT TISSUE AS NEEDED
Status: DISCONTINUED | OUTPATIENT
Start: 2024-10-28 | End: 2024-10-28 | Stop reason: SURG

## 2024-10-28 RX ORDER — NALOXONE HCL 0.4 MG/ML
0.04 VIAL (ML) INJECTION AS NEEDED
Status: DISCONTINUED | OUTPATIENT
Start: 2024-10-28 | End: 2024-10-28 | Stop reason: HOSPADM

## 2024-10-28 RX ORDER — SODIUM CHLORIDE 9 MG/ML
75 INJECTION, SOLUTION INTRAVENOUS CONTINUOUS
Status: DISPENSED | OUTPATIENT
Start: 2024-10-28 | End: 2024-10-29

## 2024-10-28 RX ORDER — FENTANYL CITRATE 50 UG/ML
50 INJECTION, SOLUTION INTRAMUSCULAR; INTRAVENOUS ONCE
Status: DISCONTINUED | OUTPATIENT
Start: 2024-10-28 | End: 2024-10-28 | Stop reason: HOSPADM

## 2024-10-28 RX ORDER — SODIUM CHLORIDE 0.9 % (FLUSH) 0.9 %
10 SYRINGE (ML) INJECTION EVERY 12 HOURS SCHEDULED
Status: DISCONTINUED | OUTPATIENT
Start: 2024-10-28 | End: 2024-10-28 | Stop reason: HOSPADM

## 2024-10-28 RX ORDER — SODIUM CHLORIDE 0.9 % (FLUSH) 0.9 %
10 SYRINGE (ML) INJECTION AS NEEDED
Status: DISCONTINUED | OUTPATIENT
Start: 2024-10-28 | End: 2024-10-28 | Stop reason: HOSPADM

## 2024-10-28 RX ORDER — SODIUM CHLORIDE, SODIUM LACTATE, POTASSIUM CHLORIDE, CALCIUM CHLORIDE 600; 310; 30; 20 MG/100ML; MG/100ML; MG/100ML; MG/100ML
100 INJECTION, SOLUTION INTRAVENOUS CONTINUOUS
Status: DISCONTINUED | OUTPATIENT
Start: 2024-10-28 | End: 2024-10-29 | Stop reason: HOSPADM

## 2024-10-28 RX ADMIN — TERAZOSIN HYDROCHLORIDE 2 MG: 2 CAPSULE ORAL at 21:39

## 2024-10-28 RX ADMIN — CEFAZOLIN 2 G: 2 INJECTION, POWDER, FOR SOLUTION INTRAMUSCULAR; INTRAVENOUS at 09:01

## 2024-10-28 RX ADMIN — METFORMIN HYDROCHLORIDE 500 MG: 500 TABLET ORAL at 17:20

## 2024-10-28 RX ADMIN — DEXAMETHASONE SODIUM PHOSPHATE 4 MG: 4 INJECTION, SOLUTION INTRAMUSCULAR; INTRAVENOUS at 10:09

## 2024-10-28 RX ADMIN — ROCURONIUM 25 MG: 50 INJECTION, SOLUTION INTRAVENOUS at 08:54

## 2024-10-28 RX ADMIN — PROPOFOL 150 MG: 10 INJECTION, EMULSION INTRAVENOUS at 08:53

## 2024-10-28 RX ADMIN — LIDOCAINE HYDROCHLORIDE 100 MG: 20 INJECTION, SOLUTION EPIDURAL; INFILTRATION; INTRACAUDAL; PERINEURAL at 08:51

## 2024-10-28 RX ADMIN — Medication 200 MCG: at 09:19

## 2024-10-28 RX ADMIN — Medication 3 ML: at 21:39

## 2024-10-28 RX ADMIN — Medication 200 MCG: at 09:25

## 2024-10-28 RX ADMIN — PROPOFOL INJECTABLE EMULSION 100 MCG/KG/MIN: 10 INJECTION, EMULSION INTRAVENOUS at 08:55

## 2024-10-28 RX ADMIN — FENTANYL CITRATE 100 MCG: 50 INJECTION, SOLUTION INTRAMUSCULAR; INTRAVENOUS at 08:51

## 2024-10-28 RX ADMIN — FENTANYL CITRATE 100 MCG: 50 INJECTION, SOLUTION INTRAMUSCULAR; INTRAVENOUS at 09:15

## 2024-10-28 RX ADMIN — HYDROMORPHONE HYDROCHLORIDE 1 MG: 1 INJECTION, SOLUTION INTRAMUSCULAR; INTRAVENOUS; SUBCUTANEOUS at 21:38

## 2024-10-28 RX ADMIN — PROPOFOL INJECTABLE EMULSION 140 MCG/KG/MIN: 10 INJECTION, EMULSION INTRAVENOUS at 09:56

## 2024-10-28 RX ADMIN — Medication 3 ML: at 08:12

## 2024-10-28 RX ADMIN — EPHEDRINE SULFATE 10 MG: 50 INJECTION INTRAVENOUS at 09:28

## 2024-10-28 RX ADMIN — HYDROMORPHONE HYDROCHLORIDE 1 MG: 1 INJECTION, SOLUTION INTRAMUSCULAR; INTRAVENOUS; SUBCUTANEOUS at 12:46

## 2024-10-28 RX ADMIN — ATORVASTATIN CALCIUM 40 MG: 40 TABLET, FILM COATED ORAL at 21:39

## 2024-10-28 RX ADMIN — ONDANSETRON 8 MG: 2 INJECTION INTRAMUSCULAR; INTRAVENOUS at 10:45

## 2024-10-28 RX ADMIN — PROPOFOL 50 MG: 10 INJECTION, EMULSION INTRAVENOUS at 08:58

## 2024-10-28 RX ADMIN — CEFAZOLIN 2000 MG: 2 INJECTION, POWDER, FOR SOLUTION INTRAMUSCULAR; INTRAVENOUS at 17:20

## 2024-10-28 RX ADMIN — GLIPIZIDE 5 MG: 5 TABLET ORAL at 17:20

## 2024-10-28 RX ADMIN — HYDROMORPHONE HYDROCHLORIDE 1 MG: 1 INJECTION, SOLUTION INTRAMUSCULAR; INTRAVENOUS; SUBCUTANEOUS at 17:06

## 2024-10-28 RX ADMIN — FAMOTIDINE 20 MG: 20 TABLET, FILM COATED ORAL at 21:39

## 2024-10-28 RX ADMIN — SODIUM CHLORIDE 75 ML/HR: 9 INJECTION, SOLUTION INTRAVENOUS at 15:02

## 2024-10-28 RX ADMIN — FENTANYL CITRATE 50 MCG: 50 INJECTION, SOLUTION INTRAMUSCULAR; INTRAVENOUS at 09:47

## 2024-10-28 RX ADMIN — SODIUM CHLORIDE, POTASSIUM CHLORIDE, SODIUM LACTATE AND CALCIUM CHLORIDE 100 ML/HR: 600; 310; 30; 20 INJECTION, SOLUTION INTRAVENOUS at 08:11

## 2024-10-28 RX ADMIN — OXYCODONE HYDROCHLORIDE 20 MG: 20 TABLET, FILM COATED, EXTENDED RELEASE ORAL at 08:15

## 2024-10-28 RX ADMIN — HYDROMORPHONE HYDROCHLORIDE 1 MG: 1 INJECTION, SOLUTION INTRAMUSCULAR; INTRAVENOUS; SUBCUTANEOUS at 00:21

## 2024-10-28 RX ADMIN — BUSPIRONE HYDROCHLORIDE 15 MG: 10 TABLET ORAL at 21:39
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
## 2024-10-28 NOTE — PLAN OF CARE
Goal Outcome Evaluation:  Plan of Care Reviewed With: patient, spouse        Progress: improving  Outcome Evaluation: PT eval completed.  Pt pleasant and agreeable to therapy.  Oriented X 4, however was slow to respond at times correcting some inaccuracies during interview I'ly of therapist.  Pt's spouse feels like the anesthetic has effected his thinking this date.  Pt at times required repeated commands for assessment due to difficulty performing task requested.  Education to reinforce spinal precautions, brace wearing schedule, and brace mgmt.  Pt performed rolling to L w/ SBA and VCs.  LSO brace donned in sitting.  Performed sidelying to sit w/ CGA, sit to/from stand w/ CGA.  Initiated gait w/ noted unsteadiness w/ wide JOSÉ LUIS and decrease step length.  Pt assisted w/ HHA and able to complete ~ 200 ft w/ CGA.  Pt w/ inconsistent JOSÉ LUIS w/ decrease heel strike on L w/ mild veering from straight path w/out LOB.  Pt will benefit from continued PT services to improve knowledge, I, safety awareness, activity tolerance, balance, and I w/ functional mobility.  Anticipate pt to return home w/ assist at discharge, however pt has a 3rd part surgery scheduled for Wed 10/30/24.  Will reassess at that time for needs at discharge.    Anticipated Discharge Disposition (PT): home with assist, other (see comments) (will assess for any new needs post operatively after the 3rd surgery)

## 2024-10-28 NOTE — ANESTHESIA PROCEDURE NOTES
Airway  Urgency: elective    Date/Time: 10/28/2024 8:55 AM    General Information and Staff    Patient location during procedure: OR  CRNA/CAA: Nicholas Bailey CRNA    Indications and Patient Condition  Indications for airway management: airway protection    Preoxygenated: yes  Mask difficulty assessment: 1 - vent by mask    Final Airway Details  Final airway type: endotracheal airway      Successful airway: ETT  Cuffed: yes   Successful intubation technique: direct laryngoscopy  Endotracheal tube insertion site: oral  Blade: D[place]  ETT size (mm): 8.0  Cormack-Lehane Classification: grade I - full view of glottis  Placement verified by: chest auscultation   Cuff volume (mL): 8  Measured from: teeth  ETT/EBT  to teeth (cm): 22  Number of attempts at approach: 2  Assessment: lips, teeth, and gum same as pre-op and atraumatic intubation    Additional Comments  Dentition remained unchanged inserted by lilian crowley

## 2024-10-28 NOTE — PLAN OF CARE
Goal Outcome Evaluation:  Plan of Care Reviewed With: patient        Progress: improving  Outcome Evaluation: AOx4, pleasant demeanor. VSS on RA. [place] has been prepped for Part 2 of [place] with completed CHG bath. PPP, dressing intact. Strong pumps and . No neuro changes. NPO since midnight. Urinals at the bedside, voiding wthout difficulty. C/O pain, PRN pain medication given with good relief provided. Safety precautions maintained with call light within reach.

## 2024-10-28 NOTE — PLAN OF CARE
Goal Outcome Evaluation:  Plan of Care Reviewed With: patient, spouse        Progress: improving  Outcome Evaluation: OT re-evaluation completed. A&O x4, but lethargic and intermittently confused, likely due to anethesia from today's surgery. Patient states pain is 7/10, agreeable to therapy. Educated on spinal precautions, patient and spouse verbalize understanding. Patient educated on log rolling and completes rolling to L side and then to supine with SBA-CGA. Patient assessed sitting EOB, BUE ROM/strength/sensation all WNL. Patient completes sit>stand with min A and requires min A intitially to complete functional mobility with HHa then improves balance, only requiring CGA for ambulation up and down hallway. Patient returns to recliner and completes stand>sit with CGA and VCs for positioning. Patient wife educated on options for bathing including shower seat and shower bench, interested and will require additional education/home details for recommendation. OT to continue to see for ADL training, functional balance training and will require a re-evaluation following 3rd back surgery this week. D/C recommendation home with assistance and home health.    Anticipated Discharge Disposition (OT): home with assist, home with home health

## 2024-10-28 NOTE — H&P
The office history and physical exam was again reviewed.  There are no changes.  Proceed with surgery as planned.  Second stage of the procedure will be performed today.

## 2024-10-28 NOTE — ANESTHESIA POSTPROCEDURE EVALUATION
"Patient: Elia Ryan    Procedure Summary       Date: 10/28/24 Room / Location:  [facility]  /  [facility]    Anesthesia Start: 0848 Anesthesia Stop: 1118    Procedure: LEFT LATERAL LUMBAR INTERBODY FUSION L3-5 WITH INSTRUMENTATION L3-4 (Spine Lumbar) Diagnosis: (M54.16)    Surgeons: GUERA Arteaga MD Provider: Nicholas Bailey CRNA    Anesthesia Type: general ASA Status: 2            Anesthesia Type: general    Vitals  Vitals Value Taken Time   /74 10/28/24 1140   Temp 98.4 °F (36.9 °C) 10/28/24 1114   Pulse 83 10/28/24 1142   Resp 18 10/28/24 1140   SpO2 93 % 10/28/24 1142   Vitals shown include unfiled device data.        Post Anesthesia Care and Evaluation    Patient location during evaluation: PACU  Patient participation: complete - patient participated  Level of consciousness: awake and awake and alert  Pain score: 0  Pain management: adequate    Airway patency: patent  Anesthetic complications: No anesthetic complications  PONV Status: none  Cardiovascular status: acceptable  Respiratory status: acceptable  Hydration status: acceptable    Comments: Patient discharged according to acceptable Jose score per RN assessment. See nursing records for further information.     Blood pressure 123/74, pulse 81, temperature 98.4 °F (36.9 °C), temperature source Temporal, resp. rate 16, height 185 cm (72.84\"), weight 109 kg (239 lb 4.8 oz), SpO2 95%.      "
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
## 2024-10-28 NOTE — ANESTHESIA PREPROCEDURE EVALUATION
Anesthesia Evaluation     no history of anesthetic complications:   NPO Solid Status: > 8 hours  NPO Liquid Status: > 8 hours           Airway   Mallampati: II  No difficulty expected  Dental          Pulmonary    (+) a smoker (quit 8/2024) Former,  Cardiovascular   Exercise tolerance: poor (<4 METS)    (+) hypertension, hyperlipidemia  (-) CAD      Neuro/Psych  (+) psychiatric history Anxiety, Depression and PTSD  (-) seizures, TIA, CVA  GI/Hepatic/Renal/Endo    (+) diabetes mellitus (borderline) type 2  (-) liver disease, no renal disease    Musculoskeletal     Abdominal    Substance History      OB/GYN          Other   arthritis,                     Anesthesia Plan    ASA 2     general     intravenous induction     Anesthetic plan, risks, benefits, and alternatives have been provided, discussed and informed consent has been obtained with: patient.      CODE STATUS:    Code Status (Patient has no pulse and is not breathing): CPR (Attempt to Resuscitate)  Medical Interventions (Patient has pulse or is breathing): Full

## 2024-10-28 NOTE — OP NOTE
Lateral lumbar interbody fusion procedure Note    Elia Ryan  10/28/2024    Pre-op Diagnosis:     1. Increasing chronic low back pain   2. Bilateral buttock, thigh, and leg radiculopathy, right worse than left   3. Neurogenic claudication   4. Multilevel lumbar degenerative disc disease L1-S1   5. Multilevel lumbar facet arthropathy, severe L4-5   6. Congenital short pedicle syndrome   7. Degenerative scoliosis, concave right L4-5   8. Retrolisthesis L1-2, L2-3, L3-4  9. Spondylolisthesis L4-5  10. Facet cyst left L4-5   11. Central and foraminal stenosis L3-S1, worse central L4-5  12.  Status post anterior decompression, ALIF with instrumentation L5-S1, 10/25/2024    Post-op Diagnosis:    same    Procedure/CPT® Codes:    1.  Left Lateral Lumbar Interbody Fusion L3-4, L4-5  2.  Anterior spinal instrumentation L3-4 (ATE lateral plate and screws)  3.  Use of titanium interbody biomechanical device for fusion L3-4, L4-5 ([employer] titanium spacer L3-4, [employer] titanium spacer L4-5)  4.  Use of allograft matrix for fusion ([employer]yst)  5.  Use of fluoroscopy for confirmation of surgical level, placement of titanium spacers and instrumentation  6.  Intraoperative neural monitoring    Anesthesia: General    Surgeon: CHERELLE Arteaga MD    Assistant: Floyd Cano PA-C    Estimated Blood Loss: Minimal    Complications: None    Condition: Stable to PACU.    Indications:    The patient is a 59-year-old who sees practitioners at the [facility] for medical issues. They presented to the office with increasing chronic low back pain along with bilateral buttock, thigh, and leg radiculopathy, the right side worse than the left along with symptoms consistent with neurogenic claudication. Imaging studies revealed multilevel lumbar degenerative disc disease and facet arthropathy along with congenital short pedicle syndrome and degenerative scoliosis concave to the right at L4-5. He was noted to have  retrolisthesis at L1-2, L2-3, and L3-4 and a spondylolisthesis at L4-5 associated with a facet cyst on the left. These findings created central and foraminal stenosis mainly from L3-S1 that was worse centrally at L4-5.      After failing conservative measures, it was mutually decided that surgery would be the best option. Risks, benefits, and complications of surgery were discussed with the patient. The patient appeared well informed and wished to proceed. We specifically discussed the risk of infection, blood loss, nerve root injury, CSF leak, and the possibility of incomplete resolution of symptoms. We also discussed the possible risk of a nonunion and the potential need for additional surgery in the event of a pseudoarthrosis or hardware failure.     We elected proceed with a staged operation.  The first stage of the procedure was performed on 10/25/2024 and involved an anterior decompression and ALIF with instrumentation of L5-S1.  Today we are performing a lateral fusion of L3-4 and L4-5 as the second stage of the procedure.  It is planned that we will be returning to surgery for a final posterior procedure in a few days.    Operative Procedure:    After obtaining informed consent and verifying the correct operative site, the patient was brought to the operating room and placed supine on operating table.  A general anesthetic was provided by the anesthesia service with the assistance of an endotracheal tube.  Once this was appropriately positioned and secured, the patient was carefully rotated into the lateral decubitus position with the left side up.  All bony prominences were well-padded.  3 inch wide cloth tape was used to maintain the patient's position.  It was also used to tip open the pelvis slightly allowing better access to the lumbar spine through a lateral approach.  Fluoroscopy was then used to identify the L3-4 and L4-5 levels, and the skin was marked for our planned incision.  The left flank was  then prepped and draped in the usual sterile fashion.  A surgical timeout was taken to confirm this was the correct patient, we were working at the correct level, and that preoperative antibiotics were given in a timely fashion.    An oblique incision was created of the left flank using a 10 blade scalpel directly centered between the L3-4 and L4-5 segments. Dissection was carried bluntly through subcutaneous tissues. Blunt dissection was also carried between the external oblique and internal oblique musculature. The transversalis fascia was pierced allowing access to the retroperitoneal space. At this point, a series of neuro monitoring probes were used to safely access the L4-5 level. We used stimulated and free run EMG for this purpose. Once nerve roots were confirmed to be out of harm's way, self retaining retractors were placed for continuous exposure.     An annulotomy was then created at the L4-5 area using a 15 blade scalpel on a long handle. A Rivas elevator was used to remove disc material off of the endplates. Disc material was removed using Kerrisons, pituitaries, and forward angled curettes. Once all disc material had been retrieved, I used the Rivas elevator to divide the contralateral annulus. This assisted with mobilization of the vertebral body. We then used a series of endplate scrapers to prepare the endplates for interbody fusion. Trial spacers were malleted into position and for the disc space it was felt that a 22 mm x 60 mm expandable implant with 8 to 12 degrees lordosis would be the best fit to restore disc height. The disc space was then thoroughly irrigated with saline solution.     A titanium spacer from the [employer] instrumentation set measuring 22 mm x 60 mm was then packed as tightly as possible with an allograft bone matrix called [employer]yst. This titanium spacer was then malleted into the L4-5 disc space under fluoroscopic guidance. It was placed as a titanium  interbody biomechanical device to assist with interbody fusion.  I then used the appropriate  to expand the spacer is much as possible within the L4-5 disc space adding not only disc height but also lordosis.  Once adequately expanded, I used graft tubes to fill the now expanded spacer with as much bone graft as possible.  After confirming the spacer was properly positioned in both the AP and lateral projections, next attention was turned to the L3-4 segment.    The neuro monitoring probes were once again used this time to access L3-4.  Once nerve roots were confirmed to be out of harm's way, self retaining retractors were placed for continuous exposure of L3-4.  This disc space was prepared in an identical fashion as L4-5.  We used first the 15 blade scalpel to create an annulotomy.  A Rivas elevators were used to remove disc material off of the endplates.  Disc material was removed using Kerrisons, pituitaries, and forward angled curettes.  Endplate scrapers were used to prepare the endplates for interbody fusion and the contralateral annulus was divided with the Rivas elevator.  Trial spacers were then malleted into position across L3-4.  A second titanium spacer was then chosen matching the final trial.  In this case, we used a 12 mm x 55 mm implant from [facility].  This spacer was packed as tightly as possible with the same allograft bone matrix.  This spacer was malleted into the L3-4 disc space under fluoroscopic guidance as a titanium interbody biomechanical device to assist with interbody fusion.    Once this spacer was confirmed to be properly positioned, I chose a 2-hole plate to help augment the fusion of L3-4. This plate was held into position using screws. I placed a 5.5 mm x 35 mm screw into both L3 and L4 as fixation.     The wound was then irrigated thoroughly. A thorough inspection was then undertaken to ensure that we had adequate hemostasis. Bleeding at this point was controlled using FloSeal and  bipolar cautery. Closure was then accomplished with a #1 Vicryl reapproximating the transversalis fascia as well as the internal and external oblique musculature. Immediate subcutaneous tissues were closed with a 3-0 Vicryl and skin closure was accomplished using Mastisol and Steri-Strips. The wound was then sterilely dressed. The patient was then carefully rotated supine onto a hospital gurney, extubated, and sent to the recovery room in good stable condition.     The patient tolerated the procedure well. There were no complications. We estimated blood loss to be minimal. The patient remained hemodynamically stable.     Intraoperative neuro monitoring was ordered and carried out throughout the procedure to add an increased level of safety for the patient.  The interpreting physician was available by means of real-time continuous, bidirectional, remote audio and visual communication as needed throughout the entire procedure.  Modalities used during the procedure included SSEP, EEG, MEP, EMG, and TOF.  There were no neuro monitoring signal changes during the procedure.    Floyd Cano PA-C provided critical assistance during the procedure. His assistance was medically necessary in order to allow the procedure to occur in the most safe and efficient manner.    CHERELLE Arteaga MD     Date: 10/28/2024  Time: 16:11 CDT

## 2024-10-29 PROCEDURE — 97535 SELF CARE MNGMENT TRAINING: CPT

## 2024-10-29 PROCEDURE — 25010000002 CEFAZOLIN PER 500 MG: Performed by: PHYSICIAN ASSISTANT

## 2024-10-29 PROCEDURE — 97116 GAIT TRAINING THERAPY: CPT

## 2024-10-29 RX ADMIN — GLIPIZIDE 5 MG: 5 TABLET ORAL at 09:18

## 2024-10-29 RX ADMIN — ATORVASTATIN CALCIUM 40 MG: 40 TABLET, FILM COATED ORAL at 20:27

## 2024-10-29 RX ADMIN — HYDROCODONE BITARTRATE AND ACETAMINOPHEN 1 TABLET: 10; 325 TABLET ORAL at 15:41

## 2024-10-29 RX ADMIN — HYDROMORPHONE HYDROCHLORIDE 1 MG: 1 INJECTION, SOLUTION INTRAMUSCULAR; INTRAVENOUS; SUBCUTANEOUS at 20:27

## 2024-10-29 RX ADMIN — FAMOTIDINE 20 MG: 20 TABLET, FILM COATED ORAL at 09:17

## 2024-10-29 RX ADMIN — BUSPIRONE HYDROCHLORIDE 15 MG: 10 TABLET ORAL at 09:17

## 2024-10-29 RX ADMIN — Medication 1000 UNITS: at 09:18

## 2024-10-29 RX ADMIN — TERAZOSIN HYDROCHLORIDE 2 MG: 2 CAPSULE ORAL at 20:27

## 2024-10-29 RX ADMIN — Medication 3 ML: at 20:27

## 2024-10-29 RX ADMIN — EMPAGLIFLOZIN 25 MG: 25 TABLET, FILM COATED ORAL at 09:17

## 2024-10-29 RX ADMIN — HYDROMORPHONE HYDROCHLORIDE 1 MG: 1 INJECTION, SOLUTION INTRAMUSCULAR; INTRAVENOUS; SUBCUTANEOUS at 09:45

## 2024-10-29 RX ADMIN — CEFAZOLIN 2000 MG: 2 INJECTION, POWDER, FOR SOLUTION INTRAMUSCULAR; INTRAVENOUS at 09:45

## 2024-10-29 RX ADMIN — SERTRALINE HYDROCHLORIDE 100 MG: 100 TABLET, FILM COATED ORAL at 09:18

## 2024-10-29 RX ADMIN — HYDROMORPHONE HYDROCHLORIDE 1 MG: 1 INJECTION, SOLUTION INTRAMUSCULAR; INTRAVENOUS; SUBCUTANEOUS at 04:44

## 2024-10-29 RX ADMIN — HYDROMORPHONE HYDROCHLORIDE 1 MG: 1 INJECTION, SOLUTION INTRAMUSCULAR; INTRAVENOUS; SUBCUTANEOUS at 12:43

## 2024-10-29 RX ADMIN — METFORMIN HYDROCHLORIDE 500 MG: 500 TABLET ORAL at 17:33

## 2024-10-29 RX ADMIN — CYCLOBENZAPRINE 10 MG: 10 TABLET, FILM COATED ORAL at 20:27

## 2024-10-29 RX ADMIN — AMLODIPINE BESYLATE 5 MG: 5 TABLET ORAL at 09:18

## 2024-10-29 RX ADMIN — CEFAZOLIN 2000 MG: 2 INJECTION, POWDER, FOR SOLUTION INTRAMUSCULAR; INTRAVENOUS at 01:31

## 2024-10-29 RX ADMIN — GABAPENTIN 400 MG: 400 CAPSULE ORAL at 09:18

## 2024-10-29 RX ADMIN — FAMOTIDINE 20 MG: 20 TABLET, FILM COATED ORAL at 20:27

## 2024-10-29 RX ADMIN — GLIPIZIDE 5 MG: 5 TABLET ORAL at 17:33

## 2024-10-29 RX ADMIN — BUSPIRONE HYDROCHLORIDE 15 MG: 10 TABLET ORAL at 20:27

## 2024-10-29 RX ADMIN — METFORMIN HYDROCHLORIDE 500 MG: 500 TABLET ORAL at 09:18

## 2024-10-29 RX ADMIN — HYDROMORPHONE HYDROCHLORIDE 1 MG: 1 INJECTION, SOLUTION INTRAMUSCULAR; INTRAVENOUS; SUBCUTANEOUS at 01:31

## 2024-10-29 NOTE — PLAN OF CARE
Goal Outcome Evaluation:  Plan of Care Reviewed With: patient        Progress: improving  Outcome Evaluation: Pt plans for 3rd sx on 10/30. Pt and TEJADA discussed and pt educated on ADL modification for increased safety/independence at discharge. TEJADA also educated on spinal precautions and LSO management and wear schedule, pt verbalized understanding.

## 2024-10-29 NOTE — PLAN OF CARE
Goal Outcome Evaluation:  Plan of Care Reviewed With: patient        Progress: improving  Outcome Evaluation: PT tx completed. Pt in bed and agrees to therapy. Pt requesting instruction on how to get out of bed. With bed flat and cues provided able to maintain log roll and t/f to EOB with SBA. Min x1 for donning of LSO. CGA for sit to stands. Pt amb 250' with CGA with improved gait mechanics. Rates pain 6/10 throughout tx. Will cont to follow.    Anticipated Discharge Disposition (PT): home with assist, other (see comments)
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
## 2024-10-29 NOTE — PLAN OF CARE
Goal Outcome Evaluation:  Plan of Care Reviewed With: patient, spouse      Pt A&Ox4. VSS. Pt on RA during day while alert, O2 titrated 1-3L when drowsy/sleeping. Part 2 surgery completed today. Abd dressing dry, intact, with dried drainage marked. Flank dressing C/D/I. LSO brace when up. Prn pain med given. BG monitored. Call light in reach. Safety maintained.

## 2024-10-29 NOTE — NURSING NOTE
PIV attempt x 2 - D. Petra RNS attempted left upper arm IV x1- unsuccessful, REG Lutz RN Instructor attempted left forearm attempt x 1 - unsuccessful. Primary RN notified, consulted vascular access.

## 2024-10-29 NOTE — PLAN OF CARE
Goal Outcome Evaluation:  Plan of Care Reviewed With: patient           Outcome Evaluation: pt plans for surgery 10/30/24 , NPO at 0000; c/o pain x2 so far this shift, see MAR for interventions; VATs placed USG PIV today; IV abx per order; voiding; up in chair today with brace and ambulating in halls; VSS; AOx4; safety maintained

## 2024-10-29 NOTE — PROGRESS NOTES
Elia Ryan  59 y.o.  male  Subjective     Post-Operative Day # 4/1    Systemic or Specific Complaints:     Patient complains of left-sided pain at this time.  Patient does have significant pain with left hip flexor exercises as well.  Encouraged continuation of the exercises.  Patient to work with physical therapy today.  Plan final stage tomorrow.  Medications helping with pain at this time.    Objective     Vital signs in last 24 hours:  Temp:  [97.9 °F (36.6 °C)-98.7 °F (37.1 °C)] 97.9 °F (36.6 °C)  Heart Rate:  [73-97] 73  Resp:  [14-18] 18  BP: (123-143)/(69-79) 128/69    Physical Exam:    Alert and oriented ×3, no acute distress, grossly neurovascularly intact, vital signs stable, dressing clean dry and intact, moving all extremities without focal deficit    Diagnostic Data:  CBC:  Results from last 7 days   Lab Units 10/26/24  0259   WBC 10*3/mm3 11.80*   RBC 10*6/mm3 3.84*   HEMOGLOBIN g/dL 11.4*   HEMATOCRIT % 34.0*   PLATELETS 10*3/mm3 233          Assessment & Plan     1. Increasing chronic low back pain   2. Bilateral buttock, thigh, and leg radiculopathy, right worse than left   3. Neurogenic claudication   4. Multilevel lumbar degenerative disc disease L1-S1   5. Multilevel lumbar facet arthropathy, severe L4-5   6. Congenital short pedicle syndrome   7. Degenerative scoliosis, concave right L4-5   8. Retrolisthesis L1-2, L2-3, L3-4  9. Spondylolisthesis L4-5  10. Facet cyst left L4-5   11. Central and foraminal stenosis L3-S1, worse central L4-5  12.  Status post anterior decompression, ALIF with instrumentation L5-S1, 10/25/2024  13.  Status post left LLIF L3-5 with instrumentation L3-4, 10/28/2024     Plan:  1. Plan final stage surgery tomorrow  2. Npo after midnight  3. periops / PT/OT/Brace today          Floyd Cano PA-C    Date: 10/29/2024  Time: 07:55 CDT

## 2024-10-29 NOTE — PLAN OF CARE
Goal Outcome Evaluation:  Plan of Care Reviewed With: patient      Pt A&Ox4. Part 2 of 3 lumbar fusion series completed 10/28. Part 3 scheduled for 10/30. Pt regular diet. Up with SBA using LSO brace. Neuro checks completed q4h. No changes noted. Surgical dressings clean, dry, intact. Pt lost IV access to left AC during this shift. Safety maintained. Call light within reach. Will continue to monitor.

## 2024-10-30 ENCOUNTER — ANESTHESIA EVENT (OUTPATIENT)
Dept: PERIOP | Facility: HOSPITAL | Age: 59
End: 2024-10-30
Payer: OTHER GOVERNMENT

## 2024-10-30 ENCOUNTER — APPOINTMENT (OUTPATIENT)
Dept: GENERAL RADIOLOGY | Facility: HOSPITAL | Age: 59
DRG: 428 | End: 2024-10-30
Payer: OTHER GOVERNMENT

## 2024-10-30 ENCOUNTER — ANESTHESIA (OUTPATIENT)
Dept: PERIOP | Facility: HOSPITAL | Age: 59
End: 2024-10-30
Payer: OTHER GOVERNMENT

## 2024-10-30 LAB
ABO GROUP BLD: NORMAL
BLD GP AB SCN SERPL QL: NEGATIVE
GLUCOSE BLDC GLUCOMTR-MCNC: 123 MG/DL (ref 70–130)
RH BLD: POSITIVE
T&S EXPIRATION DATE: NORMAL

## 2024-10-30 PROCEDURE — 25010000002 CEFAZOLIN PER 500 MG

## 2024-10-30 PROCEDURE — 86850 RBC ANTIBODY SCREEN: CPT | Performed by: ANESTHESIOLOGY

## 2024-10-30 PROCEDURE — 25810000003 LACTATED RINGERS PER 1000 ML: Performed by: ORTHOPAEDIC SURGERY

## 2024-10-30 PROCEDURE — 25010000002 CEFAZOLIN PER 500 MG: Performed by: PHYSICIAN ASSISTANT

## 2024-10-30 PROCEDURE — 25010000002 FENTANYL CITRATE (PF) 50 MCG/ML SOLUTION: Performed by: ANESTHESIOLOGY

## 2024-10-30 PROCEDURE — C1713 ANCHOR/SCREW BN/BN,TIS/BN: HCPCS | Performed by: ORTHOPAEDIC SURGERY

## 2024-10-30 PROCEDURE — 25010000002 DROPERIDOL PER 5 MG: Performed by: ANESTHESIOLOGY

## 2024-10-30 PROCEDURE — 86900 BLOOD TYPING SEROLOGIC ABO: CPT | Performed by: ANESTHESIOLOGY

## 2024-10-30 PROCEDURE — 25010000002 ONDANSETRON PER 1 MG

## 2024-10-30 PROCEDURE — 25010000002 LIDOCAINE PF 2% 2 % SOLUTION

## 2024-10-30 PROCEDURE — 0SG3071 FUSION OF LUMBOSACRAL JOINT WITH AUTOLOGOUS TISSUE SUBSTITUTE, POSTERIOR APPROACH, POSTERIOR COLUMN, OPEN APPROACH: ICD-10-PCS | Performed by: ORTHOPAEDIC SURGERY

## 2024-10-30 PROCEDURE — C9290 INJ, BUPIVACAINE LIPOSOME: HCPCS | Performed by: ORTHOPAEDIC SURGERY

## 2024-10-30 PROCEDURE — C1769 GUIDE WIRE: HCPCS | Performed by: ORTHOPAEDIC SURGERY

## 2024-10-30 PROCEDURE — 76000 FLUOROSCOPY <1 HR PHYS/QHP: CPT

## 2024-10-30 PROCEDURE — 86901 BLOOD TYPING SEROLOGIC RH(D): CPT | Performed by: ANESTHESIOLOGY

## 2024-10-30 PROCEDURE — 25010000002 HYDROMORPHONE PER 4 MG: Performed by: ANESTHESIOLOGY

## 2024-10-30 PROCEDURE — 0SG1071 FUSION OF 2 OR MORE LUMBAR VERTEBRAL JOINTS WITH AUTOLOGOUS TISSUE SUBSTITUTE, POSTERIOR APPROACH, POSTERIOR COLUMN, OPEN APPROACH: ICD-10-PCS | Performed by: ORTHOPAEDIC SURGERY

## 2024-10-30 PROCEDURE — 72100 X-RAY EXAM L-S SPINE 2/3 VWS: CPT

## 2024-10-30 PROCEDURE — 25010000002 FENTANYL CITRATE (PF) 100 MCG/2ML SOLUTION

## 2024-10-30 PROCEDURE — 97116 GAIT TRAINING THERAPY: CPT | Performed by: PHYSICAL THERAPIST

## 2024-10-30 PROCEDURE — 25810000003 SODIUM CHLORIDE PER 500 ML: Performed by: ORTHOPAEDIC SURGERY

## 2024-10-30 PROCEDURE — 25010000002 GLYCOPYRROLATE 0.4 MG/2ML SOLUTION

## 2024-10-30 PROCEDURE — 25010000002 PROPOFOL 10 MG/ML EMULSION

## 2024-10-30 PROCEDURE — 97164 PT RE-EVAL EST PLAN CARE: CPT | Performed by: PHYSICAL THERAPIST

## 2024-10-30 PROCEDURE — 97168 OT RE-EVAL EST PLAN CARE: CPT

## 2024-10-30 PROCEDURE — 82948 REAGENT STRIP/BLOOD GLUCOSE: CPT

## 2024-10-30 PROCEDURE — 25810000003 SODIUM CHLORIDE 0.9 % SOLUTION: Performed by: PHYSICIAN ASSISTANT

## 2024-10-30 PROCEDURE — 0 BUPIVACAINE LIPOSOME 1.3 % SUSPENSION 10 ML VIAL: Performed by: ORTHOPAEDIC SURGERY

## 2024-10-30 PROCEDURE — 25010000002 HYDROMORPHONE 1 MG/ML SOLUTION

## 2024-10-30 DEVICE — SET SCREW
Type: IMPLANTABLE DEVICE | Site: SPINE LUMBAR | Status: FUNCTIONAL
Brand: INVICTUS

## 2024-10-30 DEVICE — CANNULATED EXTENDED TAB POLYAXIAL REDUCTION SCREW, 6.5 MM X 50 MM
Type: IMPLANTABLE DEVICE | Site: SPINE LUMBAR | Status: FUNCTIONAL
Brand: INVICTUS

## 2024-10-30 DEVICE — TI, MIS LORDOTIC ROD, VI2, 5.5MM X 100MM
Type: IMPLANTABLE DEVICE | Site: SPINE LUMBAR | Status: FUNCTIONAL
Brand: INVICTUS

## 2024-10-30 RX ORDER — LIDOCAINE HYDROCHLORIDE 20 MG/ML
INJECTION, SOLUTION EPIDURAL; INFILTRATION; INTRACAUDAL; PERINEURAL AS NEEDED
Status: DISCONTINUED | OUTPATIENT
Start: 2024-10-30 | End: 2024-10-30 | Stop reason: SURG

## 2024-10-30 RX ORDER — SODIUM CHLORIDE 0.9 % (FLUSH) 0.9 %
10 SYRINGE (ML) INJECTION EVERY 12 HOURS SCHEDULED
Status: DISCONTINUED | OUTPATIENT
Start: 2024-10-30 | End: 2024-10-30 | Stop reason: HOSPADM

## 2024-10-30 RX ORDER — ONDANSETRON 2 MG/ML
INJECTION INTRAMUSCULAR; INTRAVENOUS AS NEEDED
Status: DISCONTINUED | OUTPATIENT
Start: 2024-10-30 | End: 2024-10-30 | Stop reason: SURG

## 2024-10-30 RX ORDER — DEXAMETHASONE SODIUM PHOSPHATE 4 MG/ML
INJECTION, SOLUTION INTRA-ARTICULAR; INTRALESIONAL; INTRAMUSCULAR; INTRAVENOUS; SOFT TISSUE AS NEEDED
Status: DISCONTINUED | OUTPATIENT
Start: 2024-10-30 | End: 2024-10-30

## 2024-10-30 RX ORDER — SODIUM CHLORIDE 9 MG/ML
40 INJECTION, SOLUTION INTRAVENOUS AS NEEDED
Status: DISCONTINUED | OUTPATIENT
Start: 2024-10-30 | End: 2024-10-30 | Stop reason: HOSPADM

## 2024-10-30 RX ORDER — SODIUM CHLORIDE, SODIUM LACTATE, POTASSIUM CHLORIDE, CALCIUM CHLORIDE 600; 310; 30; 20 MG/100ML; MG/100ML; MG/100ML; MG/100ML
100 INJECTION, SOLUTION INTRAVENOUS CONTINUOUS PRN
Status: DISCONTINUED | OUTPATIENT
Start: 2024-10-30 | End: 2024-10-30 | Stop reason: HOSPADM

## 2024-10-30 RX ORDER — SODIUM CHLORIDE 0.9 % (FLUSH) 0.9 %
10 SYRINGE (ML) INJECTION AS NEEDED
Status: DISCONTINUED | OUTPATIENT
Start: 2024-10-30 | End: 2024-10-30 | Stop reason: HOSPADM

## 2024-10-30 RX ORDER — HYDROMORPHONE HYDROCHLORIDE 1 MG/ML
0.5 INJECTION, SOLUTION INTRAMUSCULAR; INTRAVENOUS; SUBCUTANEOUS
Status: DISCONTINUED | OUTPATIENT
Start: 2024-10-30 | End: 2024-10-30 | Stop reason: HOSPADM

## 2024-10-30 RX ORDER — NALOXONE HCL 0.4 MG/ML
0.04 VIAL (ML) INJECTION AS NEEDED
Status: DISCONTINUED | OUTPATIENT
Start: 2024-10-30 | End: 2024-10-30 | Stop reason: HOSPADM

## 2024-10-30 RX ORDER — ROCURONIUM BROMIDE 10 MG/ML
INJECTION, SOLUTION INTRAVENOUS AS NEEDED
Status: DISCONTINUED | OUTPATIENT
Start: 2024-10-30 | End: 2024-10-30 | Stop reason: SURG

## 2024-10-30 RX ORDER — IBUPROFEN 600 MG/1
600 TABLET, FILM COATED ORAL EVERY 6 HOURS PRN
Status: DISCONTINUED | OUTPATIENT
Start: 2024-10-30 | End: 2024-10-30

## 2024-10-30 RX ORDER — SODIUM CHLORIDE 9 MG/ML
75 INJECTION, SOLUTION INTRAVENOUS CONTINUOUS
Status: DISCONTINUED | OUTPATIENT
Start: 2024-10-30 | End: 2024-10-31 | Stop reason: HOSPADM

## 2024-10-30 RX ORDER — LABETALOL HYDROCHLORIDE 5 MG/ML
5 INJECTION, SOLUTION INTRAVENOUS
Status: DISCONTINUED | OUTPATIENT
Start: 2024-10-30 | End: 2024-10-30 | Stop reason: HOSPADM

## 2024-10-30 RX ORDER — LIDOCAINE HYDROCHLORIDE 10 MG/ML
0.5 INJECTION, SOLUTION EPIDURAL; INFILTRATION; INTRACAUDAL; PERINEURAL ONCE AS NEEDED
Status: DISCONTINUED | OUTPATIENT
Start: 2024-10-30 | End: 2024-10-30 | Stop reason: HOSPADM

## 2024-10-30 RX ORDER — DROPERIDOL 2.5 MG/ML
0.62 INJECTION, SOLUTION INTRAMUSCULAR; INTRAVENOUS ONCE AS NEEDED
Status: COMPLETED | OUTPATIENT
Start: 2024-10-30 | End: 2024-10-30

## 2024-10-30 RX ORDER — OXYCODONE HCL 20 MG/1
20 TABLET, FILM COATED, EXTENDED RELEASE ORAL ONCE
Status: COMPLETED | OUTPATIENT
Start: 2024-10-30 | End: 2024-10-30

## 2024-10-30 RX ORDER — FENTANYL CITRATE 50 UG/ML
50 INJECTION, SOLUTION INTRAMUSCULAR; INTRAVENOUS
Status: DISCONTINUED | OUTPATIENT
Start: 2024-10-30 | End: 2024-10-30 | Stop reason: HOSPADM

## 2024-10-30 RX ORDER — SODIUM CHLORIDE 9 MG/ML
INJECTION, SOLUTION INTRAVENOUS AS NEEDED
Status: DISCONTINUED | OUTPATIENT
Start: 2024-10-30 | End: 2024-10-30 | Stop reason: HOSPADM

## 2024-10-30 RX ORDER — GLYCOPYRROLATE 0.2 MG/ML
INJECTION INTRAMUSCULAR; INTRAVENOUS AS NEEDED
Status: DISCONTINUED | OUTPATIENT
Start: 2024-10-30 | End: 2024-10-30 | Stop reason: SURG

## 2024-10-30 RX ORDER — NEOSTIGMINE METHYLSULFATE 5 MG/5 ML
SYRINGE (ML) INTRAVENOUS AS NEEDED
Status: DISCONTINUED | OUTPATIENT
Start: 2024-10-30 | End: 2024-10-30 | Stop reason: SURG

## 2024-10-30 RX ORDER — MAGNESIUM HYDROXIDE 1200 MG/15ML
LIQUID ORAL AS NEEDED
Status: DISCONTINUED | OUTPATIENT
Start: 2024-10-30 | End: 2024-10-30 | Stop reason: HOSPADM

## 2024-10-30 RX ORDER — KETAMINE HCL IN NACL, ISO-OSM 100MG/10ML
SYRINGE (ML) INJECTION AS NEEDED
Status: DISCONTINUED | OUTPATIENT
Start: 2024-10-30 | End: 2024-10-30 | Stop reason: SURG

## 2024-10-30 RX ORDER — FENTANYL CITRATE 50 UG/ML
INJECTION, SOLUTION INTRAMUSCULAR; INTRAVENOUS AS NEEDED
Status: DISCONTINUED | OUTPATIENT
Start: 2024-10-30 | End: 2024-10-30 | Stop reason: SURG

## 2024-10-30 RX ORDER — CEFAZOLIN SODIUM 1 G/3ML
INJECTION, POWDER, FOR SOLUTION INTRAMUSCULAR; INTRAVENOUS AS NEEDED
Status: DISCONTINUED | OUTPATIENT
Start: 2024-10-30 | End: 2024-10-30 | Stop reason: SURG

## 2024-10-30 RX ORDER — ONDANSETRON 2 MG/ML
4 INJECTION INTRAMUSCULAR; INTRAVENOUS
Status: DISCONTINUED | OUTPATIENT
Start: 2024-10-30 | End: 2024-10-30 | Stop reason: HOSPADM

## 2024-10-30 RX ORDER — PROPOFOL 10 MG/ML
VIAL (ML) INTRAVENOUS AS NEEDED
Status: DISCONTINUED | OUTPATIENT
Start: 2024-10-30 | End: 2024-10-30 | Stop reason: SURG

## 2024-10-30 RX ORDER — FLUMAZENIL 0.1 MG/ML
0.2 INJECTION INTRAVENOUS AS NEEDED
Status: DISCONTINUED | OUTPATIENT
Start: 2024-10-30 | End: 2024-10-30 | Stop reason: HOSPADM

## 2024-10-30 RX ORDER — OXYCODONE AND ACETAMINOPHEN 10; 325 MG/1; MG/1
1 TABLET ORAL EVERY 4 HOURS PRN
Status: DISCONTINUED | OUTPATIENT
Start: 2024-10-30 | End: 2024-10-30 | Stop reason: HOSPADM

## 2024-10-30 RX ADMIN — HYDROMORPHONE HYDROCHLORIDE 1 MG: 1 INJECTION, SOLUTION INTRAMUSCULAR; INTRAVENOUS; SUBCUTANEOUS at 21:33

## 2024-10-30 RX ADMIN — Medication 3 ML: at 21:34

## 2024-10-30 RX ADMIN — CEFAZOLIN 2000 MG: 2 INJECTION, POWDER, FOR SOLUTION INTRAMUSCULAR; INTRAVENOUS at 07:54

## 2024-10-30 RX ADMIN — CEFAZOLIN 2000 MG: 2 INJECTION, POWDER, FOR SOLUTION INTRAMUSCULAR; INTRAVENOUS at 16:06

## 2024-10-30 RX ADMIN — Medication 20 MG: at 11:45

## 2024-10-30 RX ADMIN — GLYCOPYRROLATE 0.4 MG: 0.2 INJECTION INTRAMUSCULAR; INTRAVENOUS at 11:53

## 2024-10-30 RX ADMIN — Medication 3 ML: at 08:44

## 2024-10-30 RX ADMIN — SODIUM CHLORIDE 75 ML/HR: 9 INJECTION, SOLUTION INTRAVENOUS at 16:12

## 2024-10-30 RX ADMIN — GLIPIZIDE 5 MG: 5 TABLET ORAL at 17:25

## 2024-10-30 RX ADMIN — TERAZOSIN HYDROCHLORIDE 2 MG: 2 CAPSULE ORAL at 21:33

## 2024-10-30 RX ADMIN — CYCLOBENZAPRINE 10 MG: 10 TABLET, FILM COATED ORAL at 23:47

## 2024-10-30 RX ADMIN — CEFAZOLIN 2000 MG: 2 INJECTION, POWDER, FOR SOLUTION INTRAMUSCULAR; INTRAVENOUS at 23:45

## 2024-10-30 RX ADMIN — PROPOFOL 140 MG: 10 INJECTION, EMULSION INTRAVENOUS at 10:21

## 2024-10-30 RX ADMIN — HYDROMORPHONE HYDROCHLORIDE 1 MG: 1 INJECTION, SOLUTION INTRAMUSCULAR; INTRAVENOUS; SUBCUTANEOUS at 04:31

## 2024-10-30 RX ADMIN — HYDROMORPHONE HYDROCHLORIDE 0.5 MG: 1 INJECTION, SOLUTION INTRAMUSCULAR; INTRAVENOUS; SUBCUTANEOUS at 12:38

## 2024-10-30 RX ADMIN — FAMOTIDINE 20 MG: 20 TABLET, FILM COATED ORAL at 21:33

## 2024-10-30 RX ADMIN — DROPERIDOL 0.62 MG: 2.5 INJECTION, SOLUTION INTRAMUSCULAR; INTRAVENOUS at 12:34

## 2024-10-30 RX ADMIN — ONDANSETRON 4 MG: 2 INJECTION INTRAMUSCULAR; INTRAVENOUS at 11:53

## 2024-10-30 RX ADMIN — Medication 30 MG: at 10:55

## 2024-10-30 RX ADMIN — ATORVASTATIN CALCIUM 40 MG: 40 TABLET, FILM COATED ORAL at 21:33

## 2024-10-30 RX ADMIN — ROCURONIUM 40 MG: 50 INJECTION, SOLUTION INTRAVENOUS at 10:21

## 2024-10-30 RX ADMIN — CEFAZOLIN 2 G: 330 INJECTION, POWDER, FOR SOLUTION INTRAMUSCULAR; INTRAVENOUS at 10:50

## 2024-10-30 RX ADMIN — HYDROMORPHONE HYDROCHLORIDE 1 MG: 1 INJECTION, SOLUTION INTRAMUSCULAR; INTRAVENOUS; SUBCUTANEOUS at 12:03

## 2024-10-30 RX ADMIN — HYDROMORPHONE HYDROCHLORIDE 1 MG: 1 INJECTION, SOLUTION INTRAMUSCULAR; INTRAVENOUS; SUBCUTANEOUS at 00:00

## 2024-10-30 RX ADMIN — FENTANYL CITRATE 100 MCG: 50 INJECTION, SOLUTION INTRAMUSCULAR; INTRAVENOUS at 10:21

## 2024-10-30 RX ADMIN — Medication 3 MG: at 11:53

## 2024-10-30 RX ADMIN — FENTANYL CITRATE 50 MCG: 50 INJECTION, SOLUTION INTRAMUSCULAR; INTRAVENOUS at 12:34

## 2024-10-30 RX ADMIN — HYDROCODONE BITARTRATE AND ACETAMINOPHEN 1 TABLET: 10; 325 TABLET ORAL at 17:25

## 2024-10-30 RX ADMIN — BUSPIRONE HYDROCHLORIDE 15 MG: 10 TABLET ORAL at 21:33

## 2024-10-30 RX ADMIN — SODIUM CHLORIDE, POTASSIUM CHLORIDE, SODIUM LACTATE AND CALCIUM CHLORIDE 100 ML/HR: 600; 310; 30; 20 INJECTION, SOLUTION INTRAVENOUS at 08:39

## 2024-10-30 RX ADMIN — LIDOCAINE HYDROCHLORIDE 100 MG: 20 INJECTION, SOLUTION EPIDURAL; INFILTRATION; INTRACAUDAL; PERINEURAL at 10:21

## 2024-10-30 RX ADMIN — OXYCODONE HYDROCHLORIDE 20 MG: 20 TABLET, FILM COATED, EXTENDED RELEASE ORAL at 08:42

## 2024-10-30 RX ADMIN — METFORMIN HYDROCHLORIDE 500 MG: 500 TABLET ORAL at 17:25

## 2024-10-30 RX ADMIN — OXYCODONE AND ACETAMINOPHEN 1 TABLET: 325; 10 TABLET ORAL at 13:01

## 2024-10-30 RX ADMIN — HYDROMORPHONE HYDROCHLORIDE 1 MG: 1 INJECTION, SOLUTION INTRAMUSCULAR; INTRAVENOUS; SUBCUTANEOUS at 07:54
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
## 2024-10-30 NOTE — OP NOTE
Posterior lumbar fusion with instrumentation procedure Note    Elia Ryan  10/30/2024    Pre-op Diagnosis:     1. Increasing chronic low back pain   2. Bilateral buttock, thigh, and leg radiculopathy, right worse than left   3. Neurogenic claudication   4. Multilevel lumbar degenerative disc disease L1-S1   5. Multilevel lumbar facet arthropathy, severe L4-5   6. Congenital short pedicle syndrome   7. Degenerative scoliosis, concave right L4-5   8. Retrolisthesis L1-2, L2-3, L3-4  9. Spondylolisthesis L4-5  10. Facet cyst left L4-5   11. Central and foraminal stenosis L3-S1, worse central L4-5  12.  Status post anterior decompression, ALIF with instrumentation L5-S1, 10/25/2024  13.  Status post left LLIF L3-5 with instrumentation L3-4, 10/28/2024    Post-op Diagnosis:    same    Procedure/CPT® Codes:     1.  Posterior spinal fusion L3-S1  2.  Posterior spinal instrumentation L3-S1 (ATEC pedicle screws and rods)  3.  Use of locally obtained autograft bone for fusion  4.  Use of fluoroscopy for confirmation of surgical level and placement of instrumentation  5.  Intraoperative neuromonitoring with pedicle screw stimulation     Anesthesia: General     Surgeon: CHERELLE Arteaga MD     Assistant:  Floyd Cano PA-C     Estimated Blood Loss: minimal     Complications: None     Condition: Stable to PACU.     Indications:     The patient is a 59-year-old who sees practitioners at the [facility] for medical issues. They presented to the office with increasing chronic low back pain along with bilateral buttock, thigh, and leg radiculopathy, the right side worse than the left along with symptoms consistent with neurogenic claudication. Imaging studies revealed multilevel lumbar degenerative disc disease and facet arthropathy along with congenital short pedicle syndrome and degenerative scoliosis concave to the right at L4-5. He was noted to have retrolisthesis at L1-2, L2-3, and L3-4 and a spondylolisthesis  at L4-5 associated with a facet cyst on the left. These findings created central and foraminal stenosis mainly from L3-S1 that was worse centrally at L4-5.       After failing conservative measures, it was mutually decided that surgery would be the best option. Risks, benefits, and complications of surgery were discussed with the patient. The patient appeared well informed and wished to proceed. We specifically discussed the risk of infection, blood loss, nerve root injury, CSF leak, and the possibility of incomplete resolution of symptoms. We also discussed the possible risk of a nonunion and the potential need for additional surgery in the event of a pseudoarthrosis or hardware failure.      We elected to proceed with a staged operation.  The first stage of the procedure was performed on 10/25/2024 and involved an anterior decompression and ALIF with instrumentation of L5-S1.  The second stage of the procedure was performed on 10/28/2024 and involve the left lateral fusion of L3-4 and L4-5.  Today we return to surgery for the final posterior stage of the procedure involving a posterior spinal fusion with instrumentation from L3 to S1.     Operative Procedure:     After obtaining informed consent and verifying the correct operative site, the patient was brought to the operating room. A general anesthetic was provided by the anesthesia service with the assistance of an endotracheal tube. Once this was appropriately positioned and secured, the patient was carefully rotated prone onto a Galindo frame. All bony processes were well-padded. The lumbar region was prepped and draped in usual sterile fashion. A surgical time out was taken to confirm this was the correct patient, we were working at the correct levels, and that preoperative antibiotics were given in a timely fashion.      Using fluoroscopy for guidance, a right-sided Silvia incision was created overlying L3 to S1 using a 10 blade scalpel.  Dissection was  carried through subcutaneous tissues using Bovie cautery.  The lumbar fascia was divided in line with the incision and blunt dissection was carried between the multifidus and the longissimus down to the facet joints of L3-4, L4-5 and L5-S1.  Dissection was carried laterally exposing the transverse processes of L3, L4, L5, and the sacral ala.  We then exposed the facet joints further.  The capsules were destroyed using Bovie cautery exposing as much bone as possible.  The high-speed bur was then used to decorticate the facet joints, destroying as much of the facet cartilage as possible.  I also decorticated the lateral pars region and the transverse processes.  The locally obtained autograft bone was left in situ in the posterolateral gutter.  This constituted a posterior fusion of L3-4, L4-5 and L5-S1.      Next under fluoroscopic guidance, Jamshidi needles were used to cannulate the pedicles of L3, L4, L5, and S1.  Once the needles were properly positioned in the pedicles, guidewires were placed to maintain position.  Over each of the guidewires, an [facility] pedicle screw was placed.  We used 6.5 mm x 50 mm screws into each of the pedicles.  I then used a neuromonitoring probe to stimulate the screws themselves confirming appropriate position ensuring no breach of the pedicles.  After confirming the screws were properly positioned, an appropriate-sized marta was chosen to span the pedicle screws.  The marta was tightened into position using set screws.  The set screws were tightened using the appropriate antitorque wrench and torque-limiting screwdriver provided by [facility].     Next again under fluoroscopic guidance, I created stab incisions over the pedicles on the left side at L3, L5 and S1.  Through these incisions, I used the Jamshidi needles to cannulate the pedicles.  Once properly positioned, I placed guidewires to maintain position.  I then again placed 6.5 mm x 50 mm screws into these pedicles.  I then used the  neuromonitoring probe to stimulate the screws again ensuring that they were properly positioned.  A second marta was chosen and passed under the musculature.  This was held into position using set screws tightened in the same fashion using the antitorque wrench and torque-limiting screwdriver again provided by [facility].      The wounds were then thoroughly irrigated and an inspection was then undertaken to ensure that we had adequate hemostasis.  Bleeding at this point was controlled using thrombin with Gelfoam powder and bipolar cautery.  Final fluoroscopy imaging confirmed adequate position of the newly placed posterior instrumentation spanning L3 to S1.      Wound closure was then accomplished by reapproximating the fascia with a #1 Vicryl, area immediate to subcutaneous tissues were closed using a 2-0 Vicryl and skin closure was augmented using Mastisol and Steri-Strips.  The incisions were then washed and sterilely dressed with Bioclusive dressings.      The patient was then carefully rotated supine onto a hospital gur[place], extubated, and sent to the recovery room in good stable condition.  The patient tolerated the procedure well.  There were no complications.  We estimated blood loss to be minimal.    Intraoperative neuromonitoring was ordered and carried out throughout the procedure to add an increased level of safety for the patient.  The interpreting physician was available by means of real-time continuous, bidirectional, remote audio and visual communication as needed throughout the entire procedure.  Modalities used during the procedure included SSEP, EEG, EMG, TOF, and pedicle screw stimulation.  There were no neuromonitoring signal changes during the procedure.    Floyd Cano PA-C provided critical assistance during the procedure.  His assistance was medically necessary in order to allow the procedure to occur in the most safe and efficient manner.    CHERELLE Arteaga MD     Date: 10/30/2024  Time: 17:47  CDT

## 2024-10-30 NOTE — H&P
The office history and physical exam was again reviewed.  There are no changes.  Proceed with the third and final stage of the procedure today.

## 2024-10-30 NOTE — PLAN OF CARE
Goal Outcome Evaluation:  Plan of Care Reviewed With: patient        Progress: improving  Outcome Evaluation: A/Oxx4. PRN pain meds given as requested, relief noted. No neuro changes. No new drainage noted on drsgs. Voiding with urinal. NPO after MN for sx later this morning. LSO brace used when oob. VSS. Safety maintained.

## 2024-10-30 NOTE — PLAN OF CARE
Goal Outcome Evaluation:  Plan of Care Reviewed With: patient        Progress: no change  Outcome Evaluation: PT re-eval completd. Pt was OxAx4. Pt stated that he had 4/10 pain in th low back. Prior to this pt was independent with all ADL's, driving, and mobility. Pt was able to roll into R side and sidelying to sit with SBA using handrails. Pt was able to don LSO brace. Pt was able to do sit to stand with SBA. Pt was able to ambulate 500 ft with SBA. At this time does not need skilled physical therapy. Recommened discharge home with assist. PT to sign off.    Anticipated Discharge Disposition (PT): home, home with assist

## 2024-10-30 NOTE — ANESTHESIA PROCEDURE NOTES
Airway  Urgency: elective    Date/Time: 10/30/2024 10:22 AM  Airway not difficult    General Information and Staff    Patient location during procedure: OR  CRNA/CAA: Loc Ramírez CRNA    Indications and Patient Condition  Indications for airway management: airway protection    Preoxygenated: yes  Mask difficulty assessment: 1 - vent by mask    Final Airway Details  Final airway type: endotracheal airway      Successful airway: ETT  Cuffed: yes   Successful intubation technique: direct laryngoscopy  Facilitating devices/methods: intubating stylet  Blade: Allen  Blade size: 2  ETT size (mm): 7.5  Cormack-Lehane Classification: grade IIa - partial view of glottis  Placement verified by: chest auscultation and capnometry   Measured from: lips  ETT/EBT  to lips (cm): 22  Number of attempts at approach: 1  Assessment: lips, teeth, and gum same as pre-op and atraumatic intubation
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
## 2024-10-30 NOTE — ANESTHESIA POSTPROCEDURE EVALUATION
"Patient: Elia Ryan    Procedure Summary       Date: 10/30/24 Room / [facility]:  [facility]  [facility]    Anesthesia Start: 1018 Anesthesia Stop: 1232    Procedure: POSTERIOR SPINAL FUSION WITH INSTRUMENTATION L3-S1 (Spine Lumbar) Diagnosis: (M54.16)    Surgeons: GUERA Arteaga MD Provider: Loc Ramírez CRNA    Anesthesia Type: general ASA Status: 2            Anesthesia Type: general    Vitals  Vitals Value Taken Time   /90 10/30/24 1323   Temp 99.9 °F (37.7 °C) 10/30/24 1229   Pulse 102 10/30/24 1332   Resp 12 10/30/24 1305   SpO2 95 % 10/30/24 1331   Vitals shown include unfiled device data.        Post Anesthesia Care and Evaluation    Patient location during evaluation: PACU  Patient participation: complete - patient participated  Level of consciousness: awake and alert  Pain management: adequate    Airway patency: patent  Anesthetic complications: No anesthetic complications    Cardiovascular status: acceptable  Respiratory status: acceptable  Hydration status: acceptable    Comments: Blood pressure 178/93, pulse 98, temperature 99.9 °F (37.7 °C), temperature source Temporal, resp. rate 12, height 185 cm (72.84\"), weight 109 kg (239 lb 4.8 oz), SpO2 98%.    Pt discharged from PACU based on stephen score >8    "

## 2024-10-30 NOTE — PLAN OF CARE
Goal Outcome Evaluation:  Plan of Care Reviewed With: patient        Progress: improving  Outcome Evaluation: OT re-eval completed. Pt in fowlers upon therapist arrival; A&Ox4; c/o 4/10 back pain. Pt able to recall all spinal precautions. Pt performed log roll onto R side and sidelying>sit utilizing bedrail with SBA. Pt donned LSO I. Pt performed all sit<>stand transfers with supervision. Pt performed fxl ambulation in hallway and in room with SBA-supervision. Pt performed all toileting tasks with supervision. BUE strength WNL. Skilled OT intervention not indicated at this time. Recommend home at discharge. OT to sign off.    Anticipated Discharge Disposition (OT): home with assist

## 2024-10-30 NOTE — PLAN OF CARE
Goal Outcome Evaluation:   Patient resting in bed A&O x4, VSS, Wife at bedside. Patient had part 3 of surgery today per Dr. Arteaga and tolerated well, Dressing to lower back dry and intact, no new drainage. Medicated for pain as needed. IV infusing w/o difficulty and IV antibotics continued as ordered. All safety maintained and call light in reach.

## 2024-10-31 ENCOUNTER — NURSE TRIAGE (OUTPATIENT)
Dept: CALL CENTER | Facility: HOSPITAL | Age: 59
End: 2024-10-31
Payer: OTHER GOVERNMENT

## 2024-10-31 ENCOUNTER — READMISSION MANAGEMENT (OUTPATIENT)
Dept: CALL CENTER | Facility: HOSPITAL | Age: 59
End: 2024-10-31
Payer: OTHER GOVERNMENT

## 2024-10-31 ENCOUNTER — HOSPITAL ENCOUNTER (EMERGENCY)
Facility: HOSPITAL | Age: 59
Discharge: HOME | End: 2024-11-01
Payer: COMMERCIAL

## 2024-10-31 VITALS
HEART RATE: 85 BPM | TEMPERATURE: 98.2 F | SYSTOLIC BLOOD PRESSURE: 137 MMHG | WEIGHT: 239.3 LBS | DIASTOLIC BLOOD PRESSURE: 73 MMHG | HEIGHT: 73 IN | OXYGEN SATURATION: 96 % | BODY MASS INDEX: 31.71 KG/M2 | RESPIRATION RATE: 17 BRPM

## 2024-10-31 DIAGNOSIS — Z46.89 ENCOUNTER FOR MANAGEMENT OF WOUND VAC: Primary | ICD-10-CM

## 2024-10-31 PROCEDURE — 99283 EMERGENCY DEPT VISIT LOW MDM: CPT

## 2024-10-31 PROCEDURE — 97164 PT RE-EVAL EST PLAN CARE: CPT | Performed by: PHYSICAL THERAPIST

## 2024-10-31 PROCEDURE — 97607 NEG PRS WND THR NDME<=50SQCM: CPT | Performed by: PHYSICAL THERAPIST

## 2024-10-31 RX ORDER — HYDROCODONE BITARTRATE AND ACETAMINOPHEN 10; 325 MG/1; MG/1
1 TABLET ORAL EVERY 8 HOURS PRN
Qty: 45 TABLET | Refills: 0 | Status: SHIPPED | OUTPATIENT
Start: 2024-10-31 | End: 2024-11-15

## 2024-10-31 RX ADMIN — AMLODIPINE BESYLATE 5 MG: 5 TABLET ORAL at 08:30

## 2024-10-31 RX ADMIN — FAMOTIDINE 20 MG: 20 TABLET, FILM COATED ORAL at 08:31

## 2024-10-31 RX ADMIN — GABAPENTIN 400 MG: 400 CAPSULE ORAL at 08:31

## 2024-10-31 RX ADMIN — Medication 3 ML: at 08:31

## 2024-10-31 RX ADMIN — GLIPIZIDE 5 MG: 5 TABLET ORAL at 08:31

## 2024-10-31 RX ADMIN — HYDROCODONE BITARTRATE AND ACETAMINOPHEN 1 TABLET: 10; 325 TABLET ORAL at 03:43

## 2024-10-31 RX ADMIN — EMPAGLIFLOZIN 25 MG: 25 TABLET, FILM COATED ORAL at 08:31

## 2024-10-31 RX ADMIN — BUSPIRONE HYDROCHLORIDE 15 MG: 10 TABLET ORAL at 08:31

## 2024-10-31 RX ADMIN — HYDROMORPHONE HYDROCHLORIDE 1 MG: 1 INJECTION, SOLUTION INTRAMUSCULAR; INTRAVENOUS; SUBCUTANEOUS at 04:36

## 2024-10-31 RX ADMIN — METFORMIN HYDROCHLORIDE 500 MG: 500 TABLET ORAL at 08:30

## 2024-10-31 RX ADMIN — SERTRALINE HYDROCHLORIDE 100 MG: 100 TABLET, FILM COATED ORAL at 08:30

## 2024-10-31 RX ADMIN — Medication 1000 UNITS: at 08:30

## 2024-10-31 NOTE — PLAN OF CARE
Goal Outcome Evaluation:  Plan of Care Reviewed With: patient, spouse        Progress: no change  Outcome Evaluation: PT wound eval completed for application of prevena wound vac to posterior spinal incisions x2 due to increased drainage. Pt alert and oriented x4 with c/o 6/10 pain in low back. Foam applied to B incisions with good seal achieved. Pt and family educated on wound vac precautions and to remove in 7 days, and instructed to contact Dr. Arteaga's office with any other concerns or questions. Pt to d/c home today with spouse.    Anticipated Discharge Disposition (PT): home, home with assist

## 2024-10-31 NOTE — OUTREACH NOTE
Prep Survey      Flowsheet Row Responses   [facility] discharged from? [place]   Is LACE score < 7 ? No   Eligibility Readm Mgmt   Discharge diagnosis Lumbar radiculopathy,  LEFT LATERAL LUMBAR INTERBODY FUSION L3-5 WITH INSTRUMENTATION L3-4, POSTERIOR SPINAL FUSION WITH INSTRUMENTATION L3-S1   Does the patient have one of the following disease processes/diagnoses(primary or secondary)? General Surgery   Does the patient have Home health ordered? No   Is there a DME ordered? No   Prep survey completed? Yes            Zahra HAQUE - Registered Nurse

## 2024-10-31 NOTE — DISCHARGE SUMMARY
[facility]  Sukhdev Tucker PA-C  DISCHARGE SUMMARY  Patient ID:  Elia Ryan  [id number]  59 y.o.  1965    Admit date: 10/25/2024    Discharge date and time: 10/31/2024    Admitting Physician: GUERA Arteaga MD     Indication for Admission: Planned surgical intervention    Admission Diagnoses: Lumbar radiculopathy [M54.16]    Discharge Diagnoses: Lumbar radiculopathy [M54.16]    Procedures: Staged lumbar fusion L3-S1    Problem List:   Lumbar radiculopathy      Consults: none    Admission Condition: stable    Discharged Condition: stable    Hospital Course: no immediate post operative complication     Disposition: home    Patient Instructions:      Discharge Medications        ASK your doctor about these medications        Instructions Start Date   amLODIPine 10 MG tablet  Commonly known as: NORVASC  Ask about: Which instructions should I use?   10 mg, Daily      aspirin 81 MG EC tablet   81 mg, Daily      atorvastatin 40 MG tablet  Commonly known as: LIPITOR  Ask about: Which instructions should I use?   20 mg, Daily      busPIRone 15 MG tablet  Commonly known as: BUSPAR   30 mg, Oral, 2 times daily      cyclobenzaprine 10 MG tablet  Commonly known as: FLEXERIL   10 mg, 3 Times Daily PRN      empagliflozin 25 MG tablet tablet  Commonly known as: JARDIANCE   25 mg, Daily      gabapentin 300 MG capsule  Commonly known as: NEURONTIN  Ask about: Which instructions should I use?   600 mg, 3 Times Daily      HYDROcodone-acetaminophen 5-325 MG per tablet  Commonly known as: Norco   1 tablet, Oral, Every 6 Hours PRN      meloxicam 15 MG tablet  Commonly known as: Mobic   15 mg, Oral, Daily      metFORMIN 1000 MG tablet  Commonly known as: GLUCOPHAGE  Ask about: Which instructions should I use?   500 mg, 2 Times Daily With Meals      multivitamin with minerals tablet tablet   1 tablet, Daily      sertraline 100 MG tablet  Commonly known as: ZOLOFT   200 mg, Oral, Daily             Activity: Avoid  bending lifting or twisting, brace when up and out of bed, no driving while taking narcotic pain medication, walk 15 minutes 4 times daily  Diet: regular diet  Wound Care: keep wound clean and dry  Other: Contact [facility] office with questions or concerns    Follow-up with Dr Arteaga or PA's in 2 weeks.    Electronically signed by Sukhdev Tucker PA-C 07:52 CDT 10/31/2024

## 2024-10-31 NOTE — PAYOR COMM NOTE
"ADMIT INPT 10-25-24      Elia Ryan (59 y.o. Male)       Date of Birth   1965    Social Security Number       Address   [address] [id number]    Home Phone   [phone]    MRN   [id number]       Rastafarian   Caodaism    Marital Status                               Admission Date   10/25/24    Admission Type   Elective    Admitting Provider   GUERA Arteaga MD    Attending Provider   GUERA Arteaga MD    Department, Room/Bed   [facility] 3A, 337/1       Discharge Date       Discharge Disposition       Discharge Destination                                 Attending Provider: GUERA Arteaga MD    Allergies: Lithium, Motrin [Ibuprofen]    Isolation: None   Infection: None   Code Status: CPR    Ht: 185 cm (72.84\")   Wt: 109 kg (239 lb 4.8 oz)    Admission Cmt: None   Principal Problem: Lumbar radiculopathy [M54.16]                   Active Insurance as of 10/25/2024       Primary Coverage       Payor Plan Insurance Group Employer/Plan Group    [facility] [employer]        Payor Plan Address Payor Plan Phone Number Payor Plan Fax Number Effective Dates    [address] [phone]  5/27/2019 - None Entered    [facility] [id number]         Subscriber Name Subscriber Birth Date Member ID       ELIA RYAN 1965 [id number]                     Emergency Contacts        (Rel.) Home Phone Work Phone Mobile Phone    TALIA RYAN (Spouse) [phone] -- [phone]                 History & Physical        GUERA Arteaga MD at 10/30/24 0705          The office history and physical exam was again reviewed.  There are no changes.  Proceed with the third and final stage of the procedure today.    Electronically signed by GUERA Arteaga MD at 10/30/24 0706       GUERA Arteaga MD at 10/28/24 0655          The office history and physical exam was again reviewed.  There are no changes.  Proceed with surgery as " planned.  Second stage of the procedure will be performed today.    Electronically signed by GUERA Arteaga MD at 10/28/24 0655       GUERA Arteaga MD at 10/25/24 0653          H&P reviewed. The patient was examined and there are no changes to the H&P.    Electronically signed by GUERA Arteaga MD at 10/25/24 0653   Source Note: H&P        [Media Unavailable] Scan on 10/17/2024 1207 by [employer], [place]: ANTERIOR DECOMPRESSION/ANTERIOR LUMBAR INTERBODY INSTRUMENTATION,[facility], 10/17/2024          Electronically signed by [employer] On[employer], [place] at 10/20/24 2320                 H&P signed by [employer], [place] at 10/20/24 2320         [Media Unavailable] Scan on 10/17/2024 1207 by [employer], [place]: ANTERIOR DECOMPRESSION/ANTERIOR LUMBAR INTERBODY INSTRUMENTATION,[facility], 10/17/2024          Electronically signed by [employer] On[employer], [place] at 10/20/24 2320          Operative/Procedure Notes (all)        GUERA Arteaga MD at 10/25/24 0650  Version 1 of 1         LUMBAR ANTERIOR INTERBODY FUSION  Procedure Note    Elia Ryan  10/25/2024    Pre-op Diagnosis:    1. Increasing chronic low back pain   2. Bilateral buttock, thigh, and leg radiculopathy, right worse than left   3. Neurogenic claudication   4. Multilevel lumbar degenerative disc disease L1-S1   5. Multilevel lumbar facet arthropathy, severe L4-5   6. Congenital short pedicle syndrome   7. Degenerative scoliosis, concave right L4-5   8. Retrolisthesis L1-2, L2-3, L3-4  9. Spondylolisthesis L4-5  10. Facet cyst left L4-5   11. Central and foraminal stenosis L3-S1, worse central L4-5    Post-op Diagnosis:    same    Procedure/CPT® Codes:     1. Anterior discectomy decompression with bilateral neural foraminotomy L5-S1  2. Anterior lumbar interbody fusion L5-S1  3. Anterior spinal instrumentation L5-S1 ([facility] anterior plate and screws)  4. Use of titanium interbody biomechanical device for fusion L5-S1 ([facility]  titanium spacer and screws)  5. Use of allograft bone matrix for fusion L5-S1 ([employer]ers)  6. Use of fluoroscopy for confirmation of surgical level, placement of interbody spacer and instrumentation  7. Intraoperative neural monitoring     Anesthesia: General     Surgeon: CHERELLE Arteaga MD     Co Surgeon: Dr. Gonzales Hilton D.O.     Assistant: Floyd Cano PA-C     Estimated Blood Loss: 25 mL     Complications: None     Condition: Stable to PACU.     Indications:     The patient is a 59-year-old who sees practitioners at the  for medical issues.  They presented to the office with increasing chronic low back pain along with bilateral buttock, thigh, and leg radiculopathy, the right side worse than the left along with symptoms consistent with neurogenic claudication.  Imaging studies revealed multilevel lumbar degenerative disc disease and facet arthropathy along with congenital short pedicle syndrome and degenerative scoliosis concave to the right at L4-5.  He was noted to have retrolisthesis at L1-2, L2-3, and L3-4 and a spondylolisthesis at L4-5 associated with a facet cyst on the left.  These findings created central and foraminal stenosis mainly from L3-S1 that was worse centrally at L4-5.    After failing all conservative measures, it was mutually decided that surgery would be the best option.  Risks, benefits, and complications of surgery were discussed in detail. The patient appeared well informed and wished to proceed. We specifically discussed the risk of infection, blood loss, nerve root injury, CSF leak, and the possibility of incomplete resolution of symptoms. We also discussed the possible risk of a nonunion and the potential need for additional surgery in the event of a pseudoarthrosis or hardware failure.    We elected to proceed with a staged operation.  Today we are performing an anterior decompression and fusion of L5-S1, it is planned that we will be returning to  surgery for a second posterior procedure at a later date.     Operative Procedure:     After obtaining informed consent and verifying the correct operative level, the patient was brought to the operating room and placed supine on an operating table. A general anesthetic was provided by the anesthesia service with the assistance of an endotracheal tube. Once this was appropriately positioned and secured, the anterior abdominal region was prepped and draped in usual sterile fashion. A surgical timeout was taken to confirm this was the correct patient, we were working at the correct level, and that preoperative antibiotics were given in a timely fashion.     At this point, Dr. Gonzales Hilton D.O. provided vascular access to the L5-S1 level. He performed a left sided anterior retroperitoneal approach to the L5-S1 segment. Please see his separate dictated operative report regarding the details on the approach itself.  When I entered the procedure, self retaining retractors were already in position with excellent exposure of the L5-S1 disc space.     After confirming we were at the correct level using fluoroscopy, I used a long handle 10 blade scalpel to cut into the L5-S1 disc space. A Rivas elevator was used to remove disc material off of the endplates. Disc material was retrieved using pituitaries and Kerrisons. A disc space distractor was then placed into the L5-S1 disc space and I used curettes to remove posterior disc material. Kerrisons were used to remove posterior osteophytes across the endplates of L5 and S1. There was high-grade stenosis centrally but also foraminally. I then performed a bilateral neural foraminotomy with Kerrisons and curettes. The decompression was much more involved than what is usually required for an anterior lumbar interbody fusion by itself and required significantly more time to perform.  This was due to the severe disc space collapse and high-grade foraminal stenosis.     After the  decompression was completed bilaterally and centrally, I used a series of endplate scrapers to prepare the endplates for interbody fusion. Trial spacers were then malleted into position and it was felt that an 18 mm anterior height titanium spacer with 20 degrees lordosis from the [employer] instrumentation set would be the best fit to restore disc height also restoring foraminal height and providing some indirect decompression. The disc space was then thoroughly irrigated with saline solution.  Gelfoam powder with thrombin was used to control epidural bleeding.     An 18 mm titanium spacer from the [employer] instrumentation set was then packed as tightly as possible with an allograft bone matrix called [employer]du[employer] N[employer]ers. This spacer was then malleted into the L5-S1 disc space under fluoroscopic guidance. It was placed as an interbody biomechanical device to assist with fusion.  I then placed an 8.5 mm x 30 mm graft bolt through the spacer into L5 and a 5.0 mm x 30 mm screw through the spacer into S1.  The screws were placed to help maintain position of the spacer as well as to assist with the fusion process.     A 4-hole anterior plate from the [employer] instrumentation set was then chosen. The 4 holes were drilled and four 6.0 mm x 30 mm screws were used to fix the plate across the L5-S1 segment augmenting the fusion.      We then inspected the entire operative field for any signs of bleeding.  Bleeding was once again controlled using thrombin with Gelfoam powder and bipolar cautery.  Once we ensured that adequate hemostasis was accomplished, final fluoroscopy imaging was taken to confirm adequate position of our implants. There was excellent restoration of disc height and the plate and screw construct appeared to be adequately positioned across L5-S1.    I placed a VersaWrap over the anterior instrumentation to hopefully decrease the risk of postoperative adhesions and scarring.     Please see Dr. Gonzales Hilton's separate  dictated operative report regarding the closure of the wound. Once this was accomplished, the patient was extubated and sent to the recovery room in good stable condition. We estimated blood loss to be approximately 25 mL and the patient remained hemodynamically stable during the procedure.     Intraoperative neuro monitoring was ordered and carried out throughout the procedure to add an increased level of safety for the patient.  The interpreting physician was available by means of real-time continuous, bidirectional, remote audio and visual communication as needed throughout the entire procedure.  Modalities used during the procedure included SSEP, EEG, MEP, EMG, and TOF.  There were no neuro monitoring signal changes during the procedure.    Dr. Gonzales Hilton D.O. provided vascular access to the L5-S1 level and acted as a co-surgeon in this fashion.  Floyd Cano PA-C provided critical assistance during the decompression at L5-S1 as well as during the placement of the titanium spacer, bone graft and instrumentation to obtain a fusion across L5-S1.    CHERELLE Arteaga MD    Date: 10/25/2024  Time: 10:40 CDT    Electronically signed by GUERA Arteaga MD at 10/25/24 1040       Gonzales Hilton DO at 10/25/24 0750  Version 1 of 1         Elia Ryan  10/25/2024     PREOPERATIVE DIAGNOSIS:   1. Increasing chronic low back pain   2. Bilateral buttock, thigh, and leg radiculopathy, right worse than left   3. Neurogenic claudication   4. Multilevel lumbar degenerative disc disease L1-S1   5. Multilevel lumbar facet arthropathy, severe L4-5   6. Congenital short pedicle syndrome   7. Degenerative scoliosis, concave right L4-5   8. Retrolisthesis L1-2, L2-3, L3-4  9. Spondylolisthesis L4-5  10. Facet cyst left L4-5   11. Central and foraminal stenosis L3-S1, worse central L4-5     POSTOPERATIVE DIAGNOSIS: same     PROCEDURE PERFORMED:   1.  Anterior lumbar interbody fusion of L5-S1 with  instrumentation     SURGEON: Gonzales Hilton DO   COSURGEON: Al Arteaga MD     ANESTHESIA: General.    PREPARATION: Routine.    STAFF: Circulator: Therese Zepeda RN; Erickson Ortiz RN  Physician Assistant: Floyd Cano PA-C  Scrub Person: Sarah Hanna; Sarah Barros CST; Christelle Busch  Vendor Representative: Ethan Dixon; Chaparro Pike    ESTIMATED BLOOD LOSS: 25 mL    SPECIMENS: None    COMPLICATIONS: None    INDICATIONS: Elia Ryan is a 59 y.o. male who you are currently following for chronic back pain.  Elia Ryanis scheduled for anterior lumbar interbody fusion of L5-S1 with Dr. Arteaga on 10/30/24.  The patient denies any history of DVT. The indications, risks, and possible complications of the procedure were explained to the patient, who voiced understanding and wished to proceed with surgery.     PROCEDURE IN DETAIL:   The patient was taken to the operating room and placed on the operating table in a supine position. After general anesthesia was obtained, the abdomen was prepped and draped in a sterile manner.  A transverse incision was then made in the left lower quadrant.  Careful dissection was made down through the subcutaneous tissues using the Bovie cautery to ensure hemostasis.  Any crossing veins were ligated with 3-0 silk suture and hemoclips.  The rectus fascia was identified.  It was incised with the Bovie cautery.  Kocher clamps are placed on each side of the rectus fascia and subfascial planes were established in a cephalad and caudad direction.  Once the subfascial planes were established the attention was then turned to the left rectus muscle.  Blunt mobilization was made of the left rectus muscle including its blood supply medially and to the right.  Once it was fully mobilized the attention was then turned laterally to the peritoneal reflection.  Entrance into the retroperitoneal space was established with the use of a sponge stick and the Bovie cautery.   Continued blunt mobilization was made with my hand using a finger sweeping motion moving the peritoneal contents and sacral fat pad medially and to the right.  Once it was fully mobilized the Brau-Bey retractor system was set up.  The retractor blades were set in place.  The left iliac vein was then carefully dissected free and placed safely behind the retractor blade.  The sacral vessels were carefully taken down with hemoclips.  At this point full exposure was established of the L5-S1 disc space.  The next part of the case will be dictated by Dr. Arteaga.  Upon completion of Dr. Arteaga's part of the case the wound bed was irrigated with antibiotic saline and hemostasis was observed.  The retractor blades were carefully taken out one at a time.  The structures were then placed back in their normal anatomic positions.  The rectus fascia was then closed with a #1 PDS in a running fashion to meet in the midline.  The deep layers were closed with a 2-0 Vicryl in a running fashion.  The subcutaneous layers were closed with a 3-0 Vicryl in a running fashion.  The skin was then reapproximated using a 4-0 Monocryl in a subcuticular fashion.  The wound was then cleaned.  Sterile dressings were applied. The patient tolerated the procedure well. Sponge and needle counts were correct. The patient was then awakened and extubated in the operating room and taken to the recovery room in good condition.    Gonzales Hilton DO    Date: 10/25/2024 Time: 09:09 CDT    Electronically signed by Gonzales Hilton DO at 10/25/24 0910       GUERA Arteaga MD at 10/28/24 0655  Version 1 of 1         Lateral lumbar interbody fusion procedure Note    Elia Ryan  10/28/2024    Pre-op Diagnosis:     1. Increasing chronic low back pain   2. Bilateral buttock, thigh, and leg radiculopathy, right worse than left   3. Neurogenic claudication   4. Multilevel lumbar degenerative disc disease L1-S1   5. Multilevel lumbar facet  arthropathy, severe L4-5   6. Congenital short pedicle syndrome   7. Degenerative scoliosis, concave right L4-5   8. Retrolisthesis L1-2, L2-3, L3-4  9. Spondylolisthesis L4-5  10. Facet cyst left L4-5   11. Central and foraminal stenosis L3-S1, worse central L4-5  12.  Status post anterior decompression, ALIF with instrumentation L5-S1, 10/25/2024    Post-op Diagnosis:    same    Procedure/CPT® Codes:    1.  Left Lateral Lumbar Interbody Fusion L3-4, L4-5  2.  Anterior spinal instrumentation L3-4 (ATE lateral plate and screws)  3.  Use of titanium interbody biomechanical device for fusion L3-4, L4-5 ([employer] titanium spacer L3-4, [employer] titanium spacer L4-5)  4.  Use of allograft matrix for fusion ([employer])  5.  Use of fluoroscopy for confirmation of surgical level, placement of titanium spacers and instrumentation  6.  Intraoperative neural monitoring    Anesthesia: General    Surgeon: CHERELLE Arteaga MD    Assistant: Floyd Cano PA-C    Estimated Blood Loss: Minimal    Complications: None    Condition: Stable to PACU.    Indications:    The patient is a 59-year-old who sees practitioners at the [facility] for medical issues. They presented to the office with increasing chronic low back pain along with bilateral buttock, thigh, and leg radiculopathy, the right side worse than the left along with symptoms consistent with neurogenic claudication. Imaging studies revealed multilevel lumbar degenerative disc disease and facet arthropathy along with congenital short pedicle syndrome and degenerative scoliosis concave to the right at L4-5. He was noted to have retrolisthesis at L1-2, L2-3, and L3-4 and a spondylolisthesis at L4-5 associated with a facet cyst on the left. These findings created central and foraminal stenosis mainly from L3-S1 that was worse centrally at L4-5.      After failing conservative measures, it was mutually decided that surgery would be the best option. Risks,  benefits, and complications of surgery were discussed with the patient. The patient appeared well informed and wished to proceed. We specifically discussed the risk of infection, blood loss, nerve root injury, CSF leak, and the possibility of incomplete resolution of symptoms. We also discussed the possible risk of a nonunion and the potential need for additional surgery in the event of a pseudoarthrosis or hardware failure.     We elected proceed with a staged operation.  The first stage of the procedure was performed on 10/25/2024 and involved an anterior decompression and ALIF with instrumentation of L5-S1.  Today we are performing a lateral fusion of L3-4 and L4-5 as the second stage of the procedure.  It is planned that we will be returning to surgery for a final posterior procedure in a few days.    Operative Procedure:    After obtaining informed consent and verifying the correct operative site, the patient was brought to the operating room and placed supine on operating table.  A general anesthetic was provided by the anesthesia service with the assistance of an endotracheal tube.  Once this was appropriately positioned and secured, the patient was carefully rotated into the lateral decubitus position with the left side up.  All bony prominences were well-padded.  3 inch wide cloth tape was used to maintain the patient's position.  It was also used to tip open the pelvis slightly allowing better access to the lumbar spine through a lateral approach.  Fluoroscopy was then used to identify the L3-4 and L4-5 levels, and the skin was marked for our planned incision.  The left flank was then prepped and draped in the usual sterile fashion.  A surgical timeout was taken to confirm this was the correct patient, we were working at the correct level, and that preoperative antibiotics were given in a timely fashion.    An oblique incision was created of the left flank using a 10 blade scalpel directly centered between  the L3-4 and L4-5 segments. Dissection was carried bluntly through subcutaneous tissues. Blunt dissection was also carried between the external oblique and internal oblique musculature. The transversalis fascia was pierced allowing access to the retroperitoneal space. At this point, a series of neuro monitoring probes were used to safely access the L4-5 level. We used stimulated and free run EMG for this purpose. Once nerve roots were confirmed to be out of harm's way, self retaining retractors were placed for continuous exposure.     An annulotomy was then created at the L4-5 area using a 15 blade scalpel on a long handle. A Rivas elevator was used to remove disc material off of the endplates. Disc material was removed using Kerrisons, pituitaries, and forward angled curettes. Once all disc material had been retrieved, I used the Rivas elevator to divide the contralateral annulus. This assisted with mobilization of the vertebral body. We then used a series of endplate scrapers to prepare the endplates for interbody fusion. Trial spacers were malleted into position and for the disc space it was felt that a 22 mm x 60 mm expandable implant with 8 to 12 degrees lordosis would be the best fit to restore disc height. The disc space was then thoroughly irrigated with saline solution.     A titanium spacer from the [employer] instrumentation set measuring 22 mm x 60 mm was then packed as tightly as possible with an allograft bone matrix called [employer]. This titanium spacer was then malleted into the L4-5 disc space under fluoroscopic guidance. It was placed as a titanium interbody biomechanical device to assist with interbody fusion.  I then used the appropriate  to expand the spacer is much as possible within the L4-5 disc space adding not only disc height but also lordosis.  Once adequately expanded, I used graft tubes to fill the now expanded spacer with as much bone graft as possible.  After  confirming the spacer was properly positioned in both the AP and lateral projections, next attention was turned to the L3-4 segment.    The neuro monitoring probes were once again used this time to access L3-4.  Once nerve roots were confirmed to be out of harm's way, self retaining retractors were placed for continuous exposure of L3-4.  This disc space was prepared in an identical fashion as L4-5.  We used first the 15 blade scalpel to create an annulotomy.  A Rivas elevators were used to remove disc material off of the endplates.  Disc material was removed using Kerrisons, pituitaries, and forward angled curettes.  Endplate scrapers were used to prepare the endplates for interbody fusion and the contralateral annulus was divided with the Rivas elevator.  Trial spacers were then malleted into position across L3-4.  A second titanium spacer was then chosen matching the final trial.  In this case, we used a 12 mm x 55 mm implant from [facility].  This spacer was packed as tightly as possible with the same allograft bone matrix.  This spacer was malleted into the L3-4 disc space under fluoroscopic guidance as a titanium interbody biomechanical device to assist with interbody fusion.    Once this spacer was confirmed to be properly positioned, I chose a 2-hole plate to help augment the fusion of L3-4. This plate was held into position using screws. I placed a 5.5 mm x 35 mm screw into both L3 and L4 as fixation.     The wound was then irrigated thoroughly. A thorough inspection was then undertaken to ensure that we had adequate hemostasis. Bleeding at this point was controlled using FloSeal and bipolar cautery. Closure was then accomplished with a #1 Vicryl reapproximating the transversalis fascia as well as the internal and external oblique musculature. Immediate subcutaneous tissues were closed with a 3-0 Vicryl and skin closure was accomplished using Mastisol and Steri-Strips. The wound was then sterilely dressed. The  patient was then carefully rotated supine onto a [facility] [place], extubated, and sent to the recovery room in good stable condition.     The patient tolerated the procedure well. There were no complications. We estimated blood loss to be minimal. The patient remained hemodynamically stable.     Intraoperative neuro monitoring was ordered and carried out throughout the procedure to add an increased level of safety for the patient.  The interpreting physician was available by means of real-time continuous, bidirectional, remote audio and visual communication as needed throughout the entire procedure.  Modalities used during the procedure included SSEP, EEG, MEP, EMG, and TOF.  There were no neuro monitoring signal changes during the procedure.    Floyd Cano PA-C provided critical assistance during the procedure. His assistance was medically necessary in order to allow the procedure to occur in the most safe and efficient manner.    CHERELLE Arteaga MD     Date: 10/28/2024  Time: 16:11 CDT    Electronically signed by GUERA Arteaga MD at 10/28/24 1612       GUERA Arteaga MD at 10/30/24 0706  Version 1 of 1         Posterior lumbar fusion with instrumentation procedure Note    Elia Ryan  10/30/2024    Pre-op Diagnosis:     1. Increasing chronic low back pain   2. Bilateral buttock, thigh, and leg radiculopathy, right worse than left   3. Neurogenic claudication   4. Multilevel lumbar degenerative disc disease L1-S1   5. Multilevel lumbar facet arthropathy, severe L4-5   6. Congenital short pedicle syndrome   7. Degenerative scoliosis, concave right L4-5   8. Retrolisthesis L1-2, L2-3, L3-4  9. Spondylolisthesis L4-5  10. Facet cyst left L4-5   11. Central and foraminal stenosis L3-S1, worse central L4-5  12.  Status post anterior decompression, ALIF with instrumentation L5-S1, 10/25/2024  13.  Status post left LLIF L3-5 with instrumentation L3-4, 10/28/2024    Post-op  Diagnosis:    same    Procedure/CPT® Codes:     1.  Posterior spinal fusion L3-S1  2.  Posterior spinal instrumentation L3-S1 (ATEC pedicle screws and rods)  3.  Use of locally obtained autograft bone for fusion  4.  Use of fluoroscopy for confirmation of surgical level and placement of instrumentation  5.  Intraoperative neuromonitoring with pedicle screw stimulation     Anesthesia: General     Surgeon: CHERELLE Arteaga MD     Assistant:  Floyd Cano PA-C     Estimated Blood Loss: minimal     Complications: None     Condition: Stable to PACU.     Indications:     The patient is a 59-year-old who sees practitioners at the [facility] for medical issues. They presented to the office with increasing chronic low back pain along with bilateral buttock, thigh, and leg radiculopathy, the right side worse than the left along with symptoms consistent with neurogenic claudication. Imaging studies revealed multilevel lumbar degenerative disc disease and facet arthropathy along with congenital short pedicle syndrome and degenerative scoliosis concave to the right at L4-5. He was noted to have retrolisthesis at L1-2, L2-3, and L3-4 and a spondylolisthesis at L4-5 associated with a facet cyst on the left. These findings created central and foraminal stenosis mainly from L3-S1 that was worse centrally at L4-5.       After failing conservative measures, it was mutually decided that surgery would be the best option. Risks, benefits, and complications of surgery were discussed with the patient. The patient appeared well informed and wished to proceed. We specifically discussed the risk of infection, blood loss, nerve root injury, CSF leak, and the possibility of incomplete resolution of symptoms. We also discussed the possible risk of a nonunion and the potential need for additional surgery in the event of a pseudoarthrosis or hardware failure.      We elected to proceed with a staged operation.  The first stage of the  procedure was performed on 10/25/2024 and involved an anterior decompression and ALIF with instrumentation of L5-S1.  The second stage of the procedure was performed on 10/28/2024 and involve the left lateral fusion of L3-4 and L4-5.  Today we return to surgery for the final posterior stage of the procedure involving a posterior spinal fusion with instrumentation from L3 to S1.     Operative Procedure:     After obtaining informed consent and verifying the correct operative site, the patient was brought to the operating room. A general anesthetic was provided by the anesthesia service with the assistance of an endotracheal tube. Once this was appropriately positioned and secured, the patient was carefully rotated prone onto a Galindo frame. All bony processes were well-padded. The lumbar region was prepped and draped in usual sterile fashion. A surgical time out was taken to confirm this was the correct patient, we were working at the correct levels, and that preoperative antibiotics were given in a timely fashion.      Using fluoroscopy for guidance, a right-sided Silvia incision was created overlying L3 to S1 using a 10 blade scalpel.  Dissection was carried through subcutaneous tissues using Bovie cautery.  The lumbar fascia was divided in line with the incision and blunt dissection was carried between the multifidus and the longissimus down to the facet joints of L3-4, L4-5 and L5-S1.  Dissection was carried laterally exposing the transverse processes of L3, L4, L5, and the sacral ala.  We then exposed the facet joints further.  The capsules were destroyed using Bovie cautery exposing as much bone as possible.  The high-speed bur was then used to decorticate the facet joints, destroying as much of the facet cartilage as possible.  I also decorticated the lateral pars region and the transverse processes.  The locally obtained autograft bone was left in situ in the posterolateral gutter.  This constituted a  posterior fusion of L3-4, L4-5 and L5-S1.      Next under fluoroscopic guidance, Jamshidi needles were used to cannulate the pedicles of L3, L4, L5, and S1.  Once the needles were properly positioned in the pedicles, guidewires were placed to maintain position.  Over each of the guidewires, an [employer] pedicle screw was placed.  We used 6.5 mm x 50 mm screws into each of the pedicles.  I then used a neuromonitoring probe to stimulate the screws themselves confirming appropriate position ensuring no breach of the pedicles.  After confirming the screws were properly positioned, an appropriate-sized marta was chosen to span the pedicle screws.  The marta was tightened into position using set screws.  The set screws were tightened using the appropriate antitorque wrench and torque-limiting screwdriver provided by [employer].     Next again under fluoroscopic guidance, I created stab incisions over the pedicles on the left side at L3, L5 and S1.  Through these incisions, I used the Jamshidi needles to cannulate the pedicles.  Once properly positioned, I placed guidewires to maintain position.  I then again placed 6.5 mm x 50 mm screws into these pedicles.  I then used the neuromonitoring probe to stimulate the screws again ensuring that they were properly positioned.  A second marta was chosen and passed under the musculature.  This was held into position using set screws tightened in the same fashion using the antitorque wrench and torque-limiting screwdriver again provided by [employer].      The wounds were then thoroughly irrigated and an inspection was then undertaken to ensure that we had adequate hemostasis.  Bleeding at this point was controlled using thrombin with Gelfoam powder and bipolar cautery.  Final fluoroscopy imaging confirmed adequate position of the newly placed posterior instrumentation spanning L3 to S1.      Wound closure was then accomplished by reapproximating the fascia with a #1 Vicryl, area immediate to  subcutaneous tissues were closed using a 2-0 Vicryl and skin closure was augmented using Mastisol and Steri-Strips.  The incisions were then washed and sterilely dressed with Bioclusive dressings.      The patient was then carefully rotated supine onto a hospital gurRena Lara, extubated, and sent to the recovery room in good stable condition.  The patient tolerated the procedure well.  There were no complications.  We estimated blood loss to be minimal.    Intraoperative neuromonitoring was ordered and carried out throughout the procedure to add an increased level of safety for the patient.  The interpreting physician was available by means of real-time continuous, bidirectional, remote audio and visual communication as needed throughout the entire procedure.  Modalities used during the procedure included SSEP, EEG, EMG, TOF, and pedicle screw stimulation.  There were no neuromonitoring signal changes during the procedure.    Floyd Cano PA-C provided critical assistance during the procedure.  His assistance was medically necessary in order to allow the procedure to occur in the most safe and efficient manner.    CHERELLE Arteaga MD     Date: 10/30/2024  Time: 17:47 CDT      Electronically signed by GUERA Arteaga MD at 10/30/24 5146       Physician Progress Notes (last 24 hours)  Notes from 10/30/24 0629 through 10/31/24 0629   No notes of this type exist for this encounter.       Consult Notes (last 24 hours)  Notes from 10/30/24 0630 through 10/31/24 0630   No notes of this type exist for this encounter.

## 2024-10-31 NOTE — THERAPY DISCHARGE NOTE
Acute Care - Physical Therapy Discharge Summary  [facility]       Patient Name: Elia Ryan  : 1965  MRN: [id number]    Today's Date: 10/31/2024                 Admit Date: 10/25/2024      PT Recommendation and Plan    Visit Dx:    ICD-10-CM ICD-9-CM   1. Lumbar radiculopathy [M54.16]  M54.16 724.4   2. Gait abnormality [R26.9]  R26.9 781.2   3. Impaired mobility [Z74.09]  Z74.09 799.89        Outcome Measures       Row Name 10/29/24 1200             How much help from another is currently needed...    Putting on and taking off regular lower body clothing? 3  -TS      Bathing (including washing, rinsing, and drying) 3  -TS      Toileting (which includes using toilet bed pan or urinal) 4  -TS      Putting on and taking off regular upper body clothing 4  -TS      Taking care of personal grooming (such as brushing teeth) 4  -TS      Eating meals 4  -TS      AM-PAC 6 Clicks Score (OT) 22  -TS                User Key  (r) = Recorded By, (t) = Taken By, (c) = Cosigned By      Initials Name Provider Type    TS Sarah Elmore COTA Occupational Therapist Assistant                         PT Rehab Goals       Row Name 10/31/24 1200             Bed Mobility Goal 1 (PT)    Activity/Assistive Device (Bed Mobility Goal 1, PT) rolling to left;rolling to right;bridging;scooting;sidelying to sit/sit to sidelying  -AB      Reading Level/Cues Needed (Bed Mobility Goal 1, PT) independent  -AB      Time Frame (Bed Mobility Goal 1, PT) long term goal (LTG)  -AB      Progress/Outcomes (Bed Mobility Goal 1, PT) goal met  -AB         Transfer Goal 1 (PT)    Activity/Assistive Device (Transfer Goal 1, PT) sit-to-stand/stand-to-sit;bed-to-chair/chair-to-bed  -AB      Reading Level/Cues Needed (Transfer Goal 1, PT) standby assist;independent  -AB      Time Frame (Transfer Goal 1, PT) long term goal (LTG)  -AB      Progress/Outcome (Transfer Goal 1, PT) goal met  -AB         Gait Training Goal 1 (PT)     Activity/Assistive Device (Gait Training Goal 1, PT) gait (walking locomotion);decrease fall risk;improve balance and speed;increase endurance/gait distance;increase energy conservation;other (see comments)  w/ or w/out cane or rwx  -AB      [place] Level (Gait Training Goal 1, PT) standby assist  -AB      Distance (Gait Training Goal 1, PT) 300  -AB      Time Frame (Gait Training Goal 1, PT) long term goal (LTG)  -AB      Progress/Outcome (Gait Training Goal 1, PT) goal met  -AB         Balance Goal 1 (PT)    Progress/Outcomes (Balance Goal 1, PT) goal met  -AB         Patient Education Goal (PT)    Activity (Patient Education Goal, PT) spinal precautions, brace mgmt  -AB      [place]/Cues/Accuracy (Memory Goal 2, PT) demonstrates adequately;independent;verbalizes understanding  -AB      Time Frame (Patient Education Goal, PT) long term goal (LTG)  -AB      Progress/Outcome (Patient Education Goal, PT) goal met  -AB                User Key  (r) = Recorded By, (t) = Taken By, (c) = Cosigned By      Initials Name Provider Type Discipline    Cony Boo, PTA Physical Therapist Assistant PT                        PT Discharge Summary  Anticipated Discharge Disposition (PT): home, home with assist  Reason for Discharge: Discharge from facility  Outcomes Achieved: Refer to plan of care for updates on goals achieved  Discharge Destination: Home with assist      Cony Lambert PTA   10/31/2024

## 2024-10-31 NOTE — PLAN OF CARE
Problem: Adult Inpatient Plan of Care  Goal: Plan of Care Review  Outcome: Progressing  Flowsheets (Taken 10/31/2024 0350)  Progress: no change  Outcome Evaluation: Pt A&Ox4, VSS. C/o pain with some relief from PRN meds. Up with stand by assist and back brace. Pt voiding, urinal at bedside. Dressings with dried drainage. 1.5 LPM O2 NC, CPOX. SCDs. Safety maintained, will continue to monitor.  Plan of Care Reviewed With: patient

## 2024-10-31 NOTE — PAYOR COMM NOTE
"DC HOME 10-31-24    Cristhian Ryan (59 y.o. Male)       Date of Birth   1965    Social Security Number       Address   [address] [address]    Home Phone   [phone]    MRN   [id number]       Sabianist   Orthodox    Marital Status                               Admission Date   10/25/24    Admission Type   Elective    Admitting Provider   GUERA Arteaga MD    Attending Provider       Department, Room/Bed   [facility] 3A, 337/1       Discharge Date   10/31/2024    Discharge Disposition   Home or Self Care    Discharge Destination                                 Attending Provider: (none)   Allergies: Lithium, Motrin [Ibuprofen]    Isolation: None   Infection: None   Code Status: Prior    Ht: 185 cm (72.84\")   Wt: 109 kg (239 lb 4.8 oz)    Admission Cmt: None   Principal Problem: Lumbar radiculopathy [M54.16]                   Active Insurance as of 10/25/2024       Primary Coverage       Payor Plan Insurance Group Employer/Plan Group    [facility] CCN [id number]        Payor Plan Address Payor Plan Phone Number Payor Plan Fax Number Effective Dates    [address] [phone]  5/27/2019 - None Entered    [facility] [id number]         Subscriber Name Subscriber Birth Date Member ID       CRISTHIAN RYAN 1965 [id number]                     Emergency Contacts        (Rel.) Home Phone Work Phone Mobile Phone    TALIA RYAN (Spouse) [phone] -- [phone]                 Discharge Summary        Sukhdev Tucker PA-C at 10/31/24 0752       Attestation signed by GUERA Arteaga MD at 10/31/24 0832    I have reviewed this documentation and agree.                  [facility]  Sukhdev Tucker PA-C  DISCHARGE SUMMARY  Patient ID:  Cristhian Ryan  [id number]  59 y.o.  1965    Admit date: 10/25/2024    Discharge date and time: 10/31/2024    Admitting Physician: GUERA Arteaga MD     Indication " for Admission: Planned surgical intervention    Admission Diagnoses: Lumbar radiculopathy [M54.16]    Discharge Diagnoses: Lumbar radiculopathy [M54.16]    Procedures: Staged lumbar fusion L3-S1    Problem List:   Lumbar radiculopathy      Consults: none    Admission Condition: stable    Discharged Condition: stable    Hospital Course: no immediate post operative complication     Disposition: home    Patient Instructions:      Discharge Medications        ASK your doctor about these medications        Instructions Start Date   amLODIPine 10 MG tablet  Commonly known as: NORVASC  Ask about: Which instructions should I use?   10 mg, Daily      aspirin 81 MG EC tablet   81 mg, Daily      atorvastatin 40 MG tablet  Commonly known as: LIPITOR  Ask about: Which instructions should I use?   20 mg, Daily      busPIRone 15 MG tablet  Commonly known as: BUSPAR   30 mg, Oral, 2 times daily      cyclobenzaprine 10 MG tablet  Commonly known as: FLEXERIL   10 mg, 3 Times Daily PRN      empagliflozin 25 MG tablet tablet  Commonly known as: JARDIANCE   25 mg, Daily      gabapentin 300 MG capsule  Commonly known as: NEURONTIN  Ask about: Which instructions should I use?   600 mg, 3 Times Daily      HYDROcodone-acetaminophen 5-325 MG per tablet  Commonly known as: Norco   1 tablet, Oral, Every 6 Hours PRN      meloxicam 15 MG tablet  Commonly known as: Mobic   15 mg, Oral, Daily      metFORMIN 1000 MG tablet  Commonly known as: GLUCOPHAGE  Ask about: Which instructions should I use?   500 mg, 2 Times Daily With Meals      multivitamin with minerals tablet tablet   1 tablet, Daily      sertraline 100 MG tablet  Commonly known as: ZOLOFT   200 mg, Oral, Daily             Activity: Avoid bending lifting or twisting, brace when up and out of bed, no driving while taking narcotic pain medication, walk 15 minutes 4 times daily  Diet: regular diet  Wound Care: keep wound clean and dry  Other: Contact [facility] office with questions or  concerns    Follow-up with Dr Arteaga or PA's in 2 weeks.    Electronically signed by Sukhdev Tucker PA-C 07:52 CDT 10/31/2024      Electronically signed by GUERA Arteaga MD at 10/31/24 0849

## 2024-11-01 VITALS
DIASTOLIC BLOOD PRESSURE: 72 MMHG | RESPIRATION RATE: 18 BRPM | HEART RATE: 105 BPM | OXYGEN SATURATION: 97 % | HEIGHT: 74 IN | BODY MASS INDEX: 30.03 KG/M2 | SYSTOLIC BLOOD PRESSURE: 139 MMHG | WEIGHT: 234 LBS | TEMPERATURE: 98.5 F
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
## 2024-11-01 NOTE — DISCHARGE INSTRUCTIONS
It was very nice to meet you, Elia Long. Thank you for allowing us to take care of you today at [facility].    Today you were seen in the emergency department for your symptoms. Please understand that an ER evaluation is just the start of your evaluation. We do the best we can, but we are often unable to fully find what is causing your symptoms from one evaluation.  Because of this, the goal is to determine whether you need to be evaluated in the hospital or if it is safe for you to go home and see other doctors provided such as primary care physicians or specialist on an outpatient basis.     Like we discussed, I strongly urge that you follow up with Dr. Arteaga and your primary care doctor. Please call their office to set up an appointment as soon as possible so that you can be re-evaluated for improvement in your symptoms or for any other questions.  I have provided the information needed, including phone number, to call to set up an appointment below in these discharge papers.     Educational material has also been provided in the following pages regarding what we have discussed today.     Please return to the emergency room within 12-48 hours if you experience symptoms such as the following:   Fever, chills, chest pain or shortness of breath, pain with inspiration/expiration, pain that travels to your arms, neck or back, nausea, vomiting, severe headache, tearing pain in your chest, dizziness, feel as though you are about to pass out, OR if you have any worsening symptoms, or any other concerns.

## 2024-11-01 NOTE — TELEPHONE ENCOUNTER
Advised caller to reapply new transparency overlay and reconnect tubing and turn pump back on. If pump continues to alarm and is not providing wound suction advised caller that patient would need to have pump and dressing changed completely. Verbalizes understanding.       Reason for Disposition   [1] Needs new wound dressing (e.g., dressing came off, got too wet or leaking) AND [2] patient has supplies to replace same type of dressing (i.e. not NPWT)    Additional Information   Negative: Major bleeding into NPWT device (e.g., cannister filling with blood, sudden gush of blood)   Negative: Major bleeding from wound (e.g., actively dripping or spurting)   Negative: Sounds like a life-threatening emergency to the triager   Negative: Widespread rash   Negative: [1] Wound infection suspected (i.e., pain, spreading redness, or pus; in a cut, puncture, scrape or sutured wound) AND [2] not chronic wound   Negative: [1] New foot sore or wound AND [2] diabetes mellitus   Negative: New skin sore or ulcer   Negative: SEVERE pain (e.g. excruciating)   Negative: [1] Total Contact Cast feels too tight (e.g., causing pain, numbness or toes tingling) AND [2] not improved with leg elevation   Negative: [1] NPWT AND [2] new bright red blood in canister or tubing   Negative: Patient sounds very sick or weak to the triager   Negative: [1] Looks infected (e.g., spreading redness, red streak, pus) AND [2] fever   Negative: [1] Looks infected AND [2] large red area (> 2 in. or 5 cm)   Negative: [1] Looks infected AND [2] diabetes mellitus or weak immune system (e.g., HIV positive, cancer chemo, splenectomy, organ transplant, chronic steroids)   Negative: [1] NPWT AND [2] suction is not working   Negative: [1] NPWT AND [2] device is alarming   Negative: [1] Caller has URGENT question AND [2] triager unable to answer question   Negative: [1] NPWT AND [2] dressing  came off wound   Negative: [1] NPWT AND [2] new minor bleeding (e.g., flecks  "of blood, pink-tinged drainage) AND [3] taking Coumadin (warfarin) or other strong blood thinner, or known bleeding disorder (e.g., thrombocytopenia)   Negative: [1] Looks infected (e.g., spreading redness, red streak, pus) AND [2] no fever   Negative: Total Contact Cast is damaged (e.g., cracked, broken, or feels soft after getting wet)   Negative: [1] NPWT AND [2] new or worsening odor from wound   Negative: [1] NPWT AND [2] new rash or redness around wound   Negative: [1] Wound pain AND [2] not controlled with pain medications   Negative: [1] Caller has NON-URGENT question AND [2] triager unable to answer question   Negative: [1] Compression wrap feels too tight AND [2] causing SEVERE pain   Negative: [1] Compression wrap feels too tight (e.g., numbness or toes tingling) AND [2] no improvement after using CARE ADVICE   Negative: [1] Needs new dressing (e.g. dressing came off, got too wet or leaking) AND [2] patient is not able to replace same type of dressing (e.g., not given supplies or ran out)   Negative: [1] Compression wrap feels too tight (e.g., painful, toes tingling) AND [2] has not tried leg elevation   Negative: [1] Total Contact Cast feels too tight (e.g., causing pain, numbness or toes tingling) AND [2] has not tried leg elevation    Answer Assessment - Initial Assessment Questions  1. SYMPTOM or QUESTION: \"What is your reason for calling today?\" or \"How can I best help you?\" (e.g., bleeding, redness, drainage, pain)     Patient pulled tubing and transparency loose from wound vac and dressing.   2.  WOUND APPEARANCE: \"What does the wound look like?\" \"Is there new or spreading redness?\"  If YES, ask: \"What is the size of the red area?\" (Inches, centimeters, or compare to size of a coin).  \"Has the drainage changed or increased?\"  \"Is there a new odor?\"        Na  3.  ONSET: \"When did the problem begin?\"     Just Prior to call  4.  LOCATION: \"Where is the wound(s)?\" (e.g., , ankle, lower left leg)      " "Not asked  5.  BLEEDING: \"Are you having a problem with bleeding?\"  (e.g., amount, timing)      Na  6.  PAIN: \"Is there any pain?\" If so, ask: \"How bad is the pain?\" (Scale 1-10; or mild, moderate, severe)      Na  7. FEVER: \"Do you have a fever?\" If so, ask: \"What is your temperature, how was it measured, and when did it start?\"      Na  8. OTHER SYMPTOMS: \"Do you have any other symptoms?\" (e.g., chills, rash elsewhere, new weakness)      Denies  9.  TREATMENT: \"How have you been treating the wound?\"      Wound vac  10. NEGATIVE PRESSURE WOUND THERAPY  (NPWT): \"What concerns do you have about your negative pressure dressing?\"  \"When was your last dressing change?\"  \"Are you getting a device alert or alarm?\"  \"What have you done to try to fix the problem?\"           Pump alraming after patient pulled tubing loose and part of dressing off.   11.  PREGNANCY: \"Is there any chance you are pregnant?\" \"When was your last menstrual period?        Na    Protocols used: Chronic Wounds and Negative Pressure Wound Therapy-ADULT-    "

## 2024-11-04 ENCOUNTER — READMISSION MANAGEMENT (OUTPATIENT)
Dept: CALL CENTER | Facility: HOSPITAL | Age: 59
End: 2024-11-04
Payer: OTHER GOVERNMENT

## 2024-11-04 NOTE — OUTREACH NOTE
General Surgery Week 1 Survey      Flowsheet Row Responses   [facility] patient discharged from? [place]   Does the patient have one of the following disease processes/diagnoses(primary or secondary)? General Surgery   Week 1 attempt successful? Yes   Call start time 1125   Call end time 1131   Discharge diagnosis Lumbar radiculopathy,  LEFT LATERAL LUMBAR INTERBODY FUSION L3-5 WITH INSTRUMENTATION L3-4, POSTERIOR SPINAL FUSION WITH INSTRUMENTATION L3-S1   Is patient permission given to speak with other caregiver? Yes   List who call center can speak with Spouse   Person spoke with today (if not patient) and relationship Spouse   Meds reviewed with patient/caregiver? Yes   Is the patient having any side effects they believe may be caused by any medication additions or changes? No   Does the patient have all medications related to this admission filled (includes all antibiotics, pain medications, etc.) Yes   Is the patient taking all medications as directed (includes completed medication regime)? Yes   Does the patient have a follow up appointment scheduled with their surgeon? Yes   Has the patient kept scheduled appointments due by today? N/A   Comments Surgery follow up 11/12/24   Has home health visited the patient within 72 hours of discharge? N/A   Psychosocial issues? No   Did the patient receive a copy of their discharge instructions? Yes   Nursing interventions Reviewed instructions with patient   What is the patient's perception of their health status since discharge? Improving   Nursing interventions Nurse provided patient education   Is the patient /caregiver able to teach back basic post-op care? Do not remove steri-strips, Keep incision areas clean,dry and protected, No tub bath, swimming, or hot tub until instructed by MD, Practice 'cough and deep breath'   Is the patient/caregiver able to teach back signs and symptoms of incisional infection? Increased redness, swelling or pain at the incisonal  site, Increased drainage or bleeding, Incisional warmth, Pus or odor from incision, Fever   Is the patient/caregiver able to teach back steps to recovery at home? Set small, achievable goals for return to baseline health, Rest and rebuild strength, gradually increase activity, Eat a well-balance diet   If the patient is a current smoker, are they able to teach back resources for cessation? Not a smoker   Is the patient/caregiver able to teach back the hierarchy of who to call/visit for symptoms/problems? PCP, Specialist, Home health nurse, Urgent Care, ED, 911 Yes   Week 1 call completed? Yes   Is the patient interested in additional calls from an ambulatory ? No   Would this patient benefit from a Referral to [facility] Social Work? No   Call end time 1131            Christiane CARRANZA - Registered Nurse

## 2025-04-01 ENCOUNTER — OFFICE VISIT (OUTPATIENT)
Dept: GASTROENTEROLOGY | Facility: CLINIC | Age: 60
End: 2025-04-01
Payer: OTHER GOVERNMENT

## 2025-04-01 VITALS
OXYGEN SATURATION: 97 % | SYSTOLIC BLOOD PRESSURE: 140 MMHG | BODY MASS INDEX: 29.52 KG/M2 | HEART RATE: 84 BPM | DIASTOLIC BLOOD PRESSURE: 80 MMHG | HEIGHT: 74 IN | WEIGHT: 230 LBS | TEMPERATURE: 97 F

## 2025-04-01 DIAGNOSIS — R19.5 HEME POSITIVE STOOL: Primary | ICD-10-CM

## 2025-04-01 PROCEDURE — 99204 OFFICE O/P NEW MOD 45 MIN: CPT | Performed by: NURSE PRACTITIONER

## 2025-04-01 RX ORDER — VARENICLINE TARTRATE 1 MG/1
1 TABLET, FILM COATED ORAL
COMMUNITY
Start: 2025-02-27

## 2025-04-01 RX ORDER — HYDROCODONE BITARTRATE AND ACETAMINOPHEN 5; 325 MG/1; MG/1
1 TABLET ORAL EVERY 6 HOURS PRN
COMMUNITY

## 2025-04-01 RX ORDER — MELOXICAM 15 MG/1
15 TABLET ORAL
COMMUNITY
Start: 2025-03-31
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
## 2025-04-14 ENCOUNTER — OFFICE VISIT (OUTPATIENT)
Dept: FAMILY MEDICINE CLINIC | Facility: CLINIC | Age: 60
End: 2025-04-14
Payer: OTHER GOVERNMENT

## 2025-04-14 VITALS
HEIGHT: 74 IN | OXYGEN SATURATION: 96 % | DIASTOLIC BLOOD PRESSURE: 70 MMHG | SYSTOLIC BLOOD PRESSURE: 118 MMHG | BODY MASS INDEX: 29.77 KG/M2 | WEIGHT: 232 LBS | HEART RATE: 72 BPM

## 2025-04-14 DIAGNOSIS — E55.9 VITAMIN D DEFICIENCY: ICD-10-CM

## 2025-04-14 DIAGNOSIS — E78.5 HYPERLIPIDEMIA, UNSPECIFIED HYPERLIPIDEMIA TYPE: ICD-10-CM

## 2025-04-14 DIAGNOSIS — F43.10 PTSD (POST-TRAUMATIC STRESS DISORDER): ICD-10-CM

## 2025-04-14 DIAGNOSIS — I10 ESSENTIAL HYPERTENSION: Primary | ICD-10-CM

## 2025-04-14 DIAGNOSIS — G89.29 CHRONIC NECK PAIN: ICD-10-CM

## 2025-04-14 DIAGNOSIS — M54.2 CHRONIC NECK PAIN: ICD-10-CM

## 2025-04-14 DIAGNOSIS — E11.9 TYPE 2 DIABETES MELLITUS WITHOUT COMPLICATION, WITHOUT LONG-TERM CURRENT USE OF INSULIN: ICD-10-CM

## 2025-04-14 RX ORDER — CHOLECALCIFEROL (VITAMIN D3) 25 MCG
1000 TABLET ORAL DAILY
COMMUNITY

## 2025-04-14 NOTE — PROGRESS NOTES
"Chief Complaint  Establish Care, Hypertension, Diabetes ([facility]), and Hyperlipidemia    Subjective      Elia Ryan presents to [facility]  History of Present Illness  The patient is a 60-year-old male who presents to establish care.    [facility] but is seeking to reestablish himself as a patient here due to prolonged wait times for appointments [facility]. He is accompanied by his wife, a retired nurse, who assists in managing his medication regimen. He is currently on a regimen of vitamin D supplements. He is on amlodipine 10 mg for blood pressure management, atorvastatin 40 mg for cholesterol control, Jardiance 25 mg and metformin 500 mg twice daily for diabetes management. He is also taking sertraline for PTSD, which he reports as being effective. He is on Chantix as part of his smoking cessation efforts, having successfully quit smoking last year. He is also on Norco for pain management, which [facility]. He undergoes blood tests every 3 months and was last tested a month ago, revealing elevated A1c and blood glucose levels. His A1c was recorded at 6.8. He has experienced weight loss, dropping from 260 pounds to 228 pounds. He is requesting a renewal of his parking placard. He reports experiencing pain but is able to manage it effectively with his current medication regimen.    SOCIAL HISTORY  He quit smoking last year.    MEDICATIONS  Vitamin D, amlodipine 10 mg, atorvastatin 40 mg, Jardiance 25 mg, metformin 500 mg twice daily, sertraline, Chantix, hydrocodone.      Objective   Vital Signs:  /70   Pulse 72   Ht 188 cm (74\")   Wt 105 kg (232 lb)   SpO2 96%   BMI 29.79 kg/m²   Estimated body mass index is 29.79 kg/m² as calculated from the following:    Height as of this encounter: 188 cm (74\").    Weight as of this encounter: 105 kg (232 lb).        Physical Exam     Physical Exam        Result " Review :  The following labs/imaging/notes were reviewed and discussed with the patient by Quan Louis MD on 04/14/2025:     Latest Reference Range & Units 10/26/24 02:59   Sodium 136 - 145 mmol/L 140   Potassium 3.5 - 5.2 mmol/L 4.3   Chloride 98 - 107 mmol/L 101   CO2 22.0 - 29.0 mmol/L 25.0   Anion Gap 5.0 - 15.0 mmol/L 14.0   BUN 8 - 23 mg/dL 16   Creatinine 0.76 - 1.27 mg/dL 0.61 (L)   BUN/Creatinine Ratio 7.0 - 25.0  26.2 (H)   eGFR >60.0 mL/min/1.73 109.3   Glucose 65 - 99 mg/dL 131 (H)   Calcium 8.6 - 10.5 mg/dL 8.7   WBC 3.40 - 10.80 10*3/mm3 11.80 (H)   RBC 4.14 - 5.80 10*6/mm3 3.84 (L)   Hemoglobin 13.0 - 17.7 g/dL 11.4 (L)   Hematocrit 37.5 - 51.0 % 34.0 (L)   Platelets 140 - 450 10*3/mm3 233   RDW 12.3 - 15.4 % 12.8   MCV 79.0 - 97.0 fL 88.5   MCH 26.6 - 33.0 pg 29.7   MCHC 31.5 - 35.7 g/dL 33.5   MPV 6.0 - 12.0 fL 9.5   RDW-SD 37.0 - 54.0 fl 41.4   Neutrophil Rel % 42.7 - 76.0 % 77.3 (H)   Lymphocyte Rel % 19.6 - 45.3 % 11.9 (L)   Monocyte Rel % 5.0 - 12.0 % 9.6   Eosinophil Rel % 0.3 - 6.2 % 0.3   Basophil Rel % 0.0 - 1.5 % 0.3   Immature Granulocyte Rel % 0.0 - 0.5 % 0.6 (H)   Neutrophils Absolute 1.70 - 7.00 10*3/mm3 9.13 (H)   Lymphocytes Absolute 0.70 - 3.10 10*3/mm3 1.41   Monocytes Absolute 0.10 - 0.90 10*3/mm3 1.13 (H)   Eosinophils Absolute 0.00 - 0.40 10*3/mm3 0.03   Basophils Absolute 0.00 - 0.20 10*3/mm3 0.03   Immature Grans, Absolute 0.00 - 0.05 10*3/mm3 0.07 (H)   (L): Data is abnormally low  (H): Data is abnormally high        Assessment      Diagnoses and all orders for this visit:    1. Essential hypertension (Primary)    2. Hyperlipidemia, unspecified hyperlipidemia type    3. PTSD (post-traumatic stress disorder)    4. Type 2 diabetes mellitus without complication, without long-term current use of insulin    5. Vitamin D deficiency    6. Chronic neck pain             Assessment & Plan  1. Hypertension.  He is currently on amlodipine 10 mg daily, which is effectively managing his  blood pressure. No changes to the current medication regimen are necessary.    2. Hyperlipidemia.  He is on atorvastatin 40 mg daily for cholesterol management. No changes to the current medication regimen are necessary.    3. Post-traumatic stress disorder (PTSD).  He is taking sertraline, which is working well for his PTSD symptoms. No changes to the current medication regimen are necessary.    4. Type 2 diabetes mellitus.  His A1c was reported to be 6.8 last month. He is on Jardiance 25 mg and metformin 500 mg twice daily. He is advised to continue with his current medications and maintain a balanced diet and regular exercise to keep his blood sugar levels below 7.    5. Vitamin D deficiency.  He is taking vitamin D supplements. No changes to the current supplementation regimen are necessary.    6. Pain management.  He is on Norco (hydrocodone) for pain management, which [facility]. He understands the importance of managing pain to maintain functionality and knows when to take his medication based on his pain levels. No changes to the current medication regimen are necessary.    7. Smoking cessation.  He quit smoking last year and is currently using Chantix to aid in smoking cessation. No changes to the current medication regimen are necessary.    8. Paperwork encounter.  Paperwork for parking placard will be filled out.    No orders of the defined types were placed in this encounter.      Follow Up   No follow-ups on file.  Patient was given instructions and counseling regarding his condition or for health maintenance advice. Please see specific information pulled into the AVS if appropriate.         Patient or patient representative verbalized consent for the use of Ambient Listening during the visit with  Quan Louis MD for chart documentation. 4/14/2025  12:53 CDT

## 2025-06-10 ENCOUNTER — TELEPHONE (OUTPATIENT)
Dept: FAMILY MEDICINE CLINIC | Facility: CLINIC | Age: 60
End: 2025-06-10
Payer: MEDICARE

## 2025-06-10 NOTE — TELEPHONE ENCOUNTER
Spoke with pt and they are not wanting to use pt [employer] insurance. They are wanting to use [employer]. Will bring card in tomorrow

## 2025-06-10 NOTE — TELEPHONE ENCOUNTER
----- Message from Shabnam BRENNER sent at 6/9/2025  8:53 AM CDT -----  Regarding: Referral  Good Morning,  I have a claim for this patient ([id number]) for DOS 04/14/25 that denied due to there not being a referral on file for this DOS/care.  Can a retro auth be obtained?  If not, this will have to be written off.    Thanks  Shabnam

## 2025-06-11 ENCOUNTER — HOSPITAL ENCOUNTER (OUTPATIENT)
Dept: ULTRASOUND IMAGING | Facility: HOSPITAL | Age: 60
Discharge: HOME | End: 2025-06-11
Payer: OTHER GOVERNMENT

## 2025-06-11 ENCOUNTER — OFFICE VISIT (OUTPATIENT)
Dept: VASCULAR SURGERY | Facility: CLINIC | Age: 60
End: 2025-06-11
Payer: MEDICARE

## 2025-06-11 VITALS
HEIGHT: 74 IN | WEIGHT: 227 LBS | SYSTOLIC BLOOD PRESSURE: 126 MMHG | BODY MASS INDEX: 29.13 KG/M2 | HEART RATE: 84 BPM | OXYGEN SATURATION: 98 % | DIASTOLIC BLOOD PRESSURE: 72 MMHG

## 2025-06-11 DIAGNOSIS — E66.3 OVERWEIGHT (BMI 25.0-29.9): ICD-10-CM

## 2025-06-11 DIAGNOSIS — I65.23 BILATERAL CAROTID ARTERY STENOSIS: ICD-10-CM

## 2025-06-11 DIAGNOSIS — I73.9 PAD (PERIPHERAL ARTERY DISEASE): ICD-10-CM

## 2025-06-11 DIAGNOSIS — E11.9 TYPE 2 DIABETES MELLITUS WITHOUT COMPLICATION, WITHOUT LONG-TERM CURRENT USE OF INSULIN: ICD-10-CM

## 2025-06-11 DIAGNOSIS — Z72.0 TOBACCO ABUSE: ICD-10-CM

## 2025-06-11 DIAGNOSIS — E78.5 HYPERLIPIDEMIA, UNSPECIFIED HYPERLIPIDEMIA TYPE: ICD-10-CM

## 2025-06-11 DIAGNOSIS — I10 ESSENTIAL HYPERTENSION: ICD-10-CM

## 2025-06-11 DIAGNOSIS — I65.23 BILATERAL CAROTID ARTERY STENOSIS: Primary | ICD-10-CM

## 2025-06-11 PROCEDURE — 93880 EXTRACRANIAL BILAT STUDY: CPT

## 2025-06-11 PROCEDURE — 93923 UPR/LXTR ART STDY 3+ LVLS: CPT

## (undated) DEVICE — CABL F/O LITE BIF 4.6MM 10FT STRL

## (undated) DEVICE — ANTIBACTERIAL UNDYED BRAIDED (POLYGLACTIN 910), SYNTHETIC ABSORBABLE SUTURE: Brand: COATED VICRYL

## (undated) DEVICE — TRAP FLD MINIVAC MEGADYNE 100ML

## (undated) DEVICE — PK SPINE POST 30

## (undated) DEVICE — SPNG GZ WOVN 4X4IN 12PLY 10/BX STRL

## (undated) DEVICE — SPONGE,LAP,12"X12",XR,ST,5/PK,40PK/CS: Brand: MEDLINE

## (undated) DEVICE — PK SPINE LAT 30

## (undated) DEVICE — YANKAUER SUCTION INSTRUMENT WITHOUT CONTROL VENT, OPEN TIP, CLEAR: Brand: YANKAUER

## (undated) DEVICE — 4-PORT MANIFOLD: Brand: NEPTUNE 2

## (undated) DEVICE — Device

## (undated) DEVICE — NDL TP BVL JAMSHIDI ACC GUIDE ARCUS

## (undated) DEVICE — PTP ACCESS PUSHER CAP: Brand: LIF-PTP

## (undated) DEVICE — SIGMA LTP INTRADISCAL SHIM, NARROW: Brand: SIGMA

## (undated) DEVICE — SUT MNCRYL 4/0 PS2 27IN UD MCP426H

## (undated) DEVICE — LIGACLIP MCA MULTIPLE CLIP APPLIERS, 20 LARGE CLIPS
Type: IMPLANTABLE DEVICE | Site: ABDOMEN | Status: NON-FUNCTIONAL
Brand: LIGACLIP
Removed: 2024-10-25

## (undated) DEVICE — LIGACLIP MCA MULTIPLE CLIP APPLIERS, 20 SMALL CLIPS
Type: IMPLANTABLE DEVICE | Site: ABDOMEN | Status: NON-FUNCTIONAL
Brand: LIGACLIP
Removed: 2024-10-25

## (undated) DEVICE — TROCAR TIP, STAINLESS STEEL GUIDEWIRE, 310MM: Brand: IDENTITI

## (undated) DEVICE — SUT SILK 0 SUTUPAK TIES 24IN SA76G

## (undated) DEVICE — TROCAR TIP NITINOL GUIDEWIRE 18": Brand: INVICTUS

## (undated) DEVICE — TOTAL TRAY, 16FR 10ML SIL FOLEY, URN: Brand: MEDLINE

## (undated) DEVICE — SUT SILK 3/0 SUTUPAK TIES 24IN SA74H

## (undated) DEVICE — GLV SURG SENSICARE W/ALOE PF LF 7.5 STRL

## (undated) DEVICE — BIPOLAR SEALER 23-113-1 AQM 2.3: Brand: AQUAMANTYS™

## (undated) DEVICE — ELECTRD BLD EZ CLN MOD XLNG 2.75IN

## (undated) DEVICE — CLTH CLENS READYCLEANSE PERI CARE PK/5

## (undated) DEVICE — GOWN,NON-REINFORCED,SIRUS,SET IN SLV,XL: Brand: MEDLINE

## (undated) DEVICE — SUT PROLN 5/0 C1 DA 24IN 8725H

## (undated) DEVICE — SPONGE,DISSECTOR,K,XRAY,9/16"X1/4",STRL: Brand: MEDLINE

## (undated) DEVICE — GLV SURG DERMASSURE GRN LF PF 8.0

## (undated) DEVICE — GLV SURG GRN DERMASSURE LF PF 7.5

## (undated) DEVICE — DRP C/ARMOR

## (undated) DEVICE — ELECTRD BLD EZ CLN STD 6.5IN

## (undated) DEVICE — INTENDED FOR TISSUE SEPARATION, AND OTHER PROCEDURES THAT REQUIRE A SHARP SURGICAL BLADE TO PUNCTURE OR CUT.: Brand: BARD-PARKER ®  SAFETY SCALPED

## (undated) DEVICE — SUT SILK 2/0 SUTUPAK TIES 24IN SA75H

## (undated) DEVICE — PACK,SET UP,NO DRAPES: Brand: MEDLINE

## (undated) DEVICE — CATH IV ANGIO FEP 12G 3IN LTBLU 10PK

## (undated) DEVICE — SAFEOP STIMULATING DILATOR KIT, STERILE - OVAL: Brand: SAFEOP

## (undated) DEVICE — SUT SILK 2/0 SH 75CM 30IN BLK C016D

## (undated) DEVICE — SUT PDS 0 CTX 36IN VIO PDP370T

## (undated) DEVICE — GLV SURG BIOGEL LTX PF 7 1/2

## (undated) DEVICE — DRSNG SURESITE WNDW 4X4.5

## (undated) DEVICE — CVR UNIV C/ARM

## (undated) DEVICE — APPL CHLORAPREP HI/LITE 26ML ORNG

## (undated) DEVICE — SAFEOP EMG & SSEP NEEDLE ELECTRODE KIT: Brand: SAFEOP

## (undated) DEVICE — SUT SILK 4/0 SUTUPAK TIES 24IN SA73H

## (undated) DEVICE — PATIENT RETURN ELECTRODE, SINGLE-USE, CONTACT QUALITY MONITORING, ADULT, WITH 9FT CORD, FOR PATIENTS WEIGING OVER 33LBS. (15KG): Brand: MEGADYNE

## (undated) DEVICE — SAFEOP STIMULATING BALL-TIP PROBE, STERILE: Brand: SAFEOP

## (undated) DEVICE — LIGACLIP MCA MULTIPLE CLIP APPLIERS, 30 MEDIUM CLIPS
Type: IMPLANTABLE DEVICE | Site: ABDOMEN | Status: NON-FUNCTIONAL
Brand: LIGACLIP
Removed: 2024-10-25

## (undated) DEVICE — SIGMA LTP BLADE EXTENDER, NARROW, 26MM: Brand: SIGMA

## (undated) DEVICE — SPK10277 JACKSON/PRO-AXIS KIT: Brand: SPK10277 JACKSON/PRO-AXIS KIT

## (undated) DEVICE — DRAPE,UTILITY,TAPE,15X26,STERILE: Brand: MEDLINE

## (undated) DEVICE — SAFEOP STIMULATING CLIP, STERILE: Brand: SAFEOP

## (undated) DEVICE — TAPE,CLOTH/SILK,CURAD,3"X10YD,LF,40/CS: Brand: CURAD